# Patient Record
Sex: FEMALE | Race: WHITE | Employment: UNEMPLOYED | URBAN - NONMETROPOLITAN AREA
[De-identification: names, ages, dates, MRNs, and addresses within clinical notes are randomized per-mention and may not be internally consistent; named-entity substitution may affect disease eponyms.]

---

## 2022-02-09 ENCOUNTER — TELEPHONE (OUTPATIENT)
Dept: NEUROLOGY | Age: 52
End: 2022-02-09

## 2022-02-16 NOTE — TELEPHONE ENCOUNTER
Based on my reading of the referral packet, she had a coiling of an unruptured aneurysm. Based on that she should have a repeat angio at 6 months and 18 months from the date of treatment. Based on this she may have had one in December. If so, she should come in with the angio images and we will review and make a plan for future management. If she has not had a repeat angio then she will need to come in with images from the procedure and we will review and schedule the DSA. In summary, we can see her at our next available but we have to have images from the last procedure she had. No need for imaging before her appointment with us. Let me know if you have other questions.

## 2022-02-17 NOTE — TELEPHONE ENCOUNTER
LM regarding patient's history and the possibility of getting images from previous angio. Pt to call me with this information.

## 2022-02-25 ENCOUNTER — TELEPHONE (OUTPATIENT)
Dept: NEUROLOGY | Age: 52
End: 2022-02-25

## 2022-02-25 NOTE — TELEPHONE ENCOUNTER
Per [facility], films are being mailed to office. Once receiving disks, will call and schedule patient.

## 2022-03-01 NOTE — TELEPHONE ENCOUNTER
Received patient's films from [facility].     LM for patient to schedule consult per [facility] referral.

## 2022-03-29 ENCOUNTER — HOSPITAL ENCOUNTER (OUTPATIENT)
Dept: GENERAL RADIOLOGY | Age: 52
Discharge: HOME | End: 2022-03-29

## 2022-03-29 DIAGNOSIS — Z00.6 EXAMINATION FOR NORMAL COMPARISON FOR CLINICAL RESEARCH: ICD-10-CM

## 2022-04-26 ENCOUNTER — OFFICE VISIT (OUTPATIENT)
Dept: NEUROLOGY | Age: 52
End: 2022-04-26
Payer: COMMERCIAL

## 2022-04-26 VITALS
HEIGHT: 60 IN | WEIGHT: 181.4 LBS | BODY MASS INDEX: 35.61 KG/M2 | DIASTOLIC BLOOD PRESSURE: 88 MMHG | OXYGEN SATURATION: 97 % | HEART RATE: 62 BPM | SYSTOLIC BLOOD PRESSURE: 140 MMHG

## 2022-04-26 DIAGNOSIS — I67.1 CEREBRAL ANEURYSM: Primary | ICD-10-CM

## 2022-04-26 PROCEDURE — G8427 DOCREV CUR MEDS BY ELIG CLIN: HCPCS | Performed by: NURSE PRACTITIONER

## 2022-04-26 PROCEDURE — 4004F PT TOBACCO SCREEN RCVD TLK: CPT | Performed by: NURSE PRACTITIONER

## 2022-04-26 PROCEDURE — G8417 CALC BMI ABV UP PARAM F/U: HCPCS | Performed by: NURSE PRACTITIONER

## 2022-04-26 PROCEDURE — 99203 OFFICE O/P NEW LOW 30 MIN: CPT | Performed by: NURSE PRACTITIONER

## 2022-04-26 PROCEDURE — 3017F COLORECTAL CA SCREEN DOC REV: CPT | Performed by: NURSE PRACTITIONER

## 2022-04-26 RX ORDER — GABAPENTIN 300 MG/1
900 CAPSULE ORAL 3 TIMES DAILY
COMMUNITY

## 2022-04-26 RX ORDER — CLOPIDOGREL BISULFATE 75 MG/1
75 TABLET ORAL DAILY
Status: ON HOLD | COMMUNITY
End: 2022-05-09 | Stop reason: ALTCHOICE

## 2022-04-26 RX ORDER — SUMATRIPTAN 100 MG/1
100 TABLET, FILM COATED ORAL
COMMUNITY

## 2022-04-26 RX ORDER — TOPIRAMATE 25 MG/1
50 TABLET ORAL 2 TIMES DAILY
COMMUNITY

## 2022-04-26 ASSESSMENT — ENCOUNTER SYMPTOMS
COUGH: 0
SHORTNESS OF BREATH: 0
NAUSEA: 0
ABDOMINAL PAIN: 0
BACK PAIN: 1
VOMITING: 0
SORE THROAT: 0
RHINORRHEA: 0

## 2022-04-26 NOTE — PROGRESS NOTES
Neurology Office Note    Date:4/26/2022        Patient Name:Patito Dalton     YOB: 1970     Age:52 y.o. Requesting Physician: No att. 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 Her right ICA aneurysm was incidently found during a hospitalization where she had right arm numbness. She had this completed in [place] and then moved to [place] shortly after the procedure. She has not had any follow-up imaging. She has not had any follow-up imaging. She does report that she took both aspirin and Plavix for 6 month, but is currently just taking aspirin. She is a current smoker and has cut down from a pack per day to half pack per day. She does have a grand mother who has a history of a brain aneurysm. She is having her children and grandchildren getting checked out for for aneurysms. She does report headaches, lightheadedness, and dizziness. She denies any further numbness, weakness, speech difficulty, or new vision changes. She does have an established primary care provider in the [address] [place] [address] who is managing her risk factors. Review of Systems   Review of Systems   Constitutional: Negative for chills and fever. HENT: Negative for rhinorrhea and sore throat. Eyes: Positive for visual disturbance (chronic blurred vision, reports she needs to see an eye doctor). Respiratory: Negative for cough and shortness of breath.     Cardiovascular: Negative for chest pain and palpitations. Gastrointestinal: Negative for abdominal pain, nausea and vomiting. Genitourinary: Negative for dysuria. Musculoskeletal: Positive for back pain. Negative for arthralgias and myalgias. Skin: Negative for rash. Neurological: Positive for dizziness, light-headedness and headaches. Negative for facial asymmetry, speech difficulty, weakness and numbness. Psychiatric/Behavioral: The patient is not nervous/anxious. Medications   Home Meds:   Current Outpatient Medications on File Prior to Visit   Medication Sig Dispense Refill    topiramate (TOPAMAX) 25 MG tablet Take 50 mg by mouth 2 times daily      SUMAtriptan (IMITREX) 100 MG tablet Take 100 mg by mouth once as needed for Migraine      clopidogrel (PLAVIX) 75 MG tablet Take 75 mg by mouth daily      gabapentin (NEURONTIN) 300 MG capsule Take 900 mg by mouth 3 times daily. 3 capsules of 300mg TID      aspirin 81 MG EC tablet Take 81 mg by mouth daily. benzonatate (TESSALON) 100 MG capsule Take 100 mg by mouth 3 times daily as needed for Cough. Loratadine 10 MG CAPS Take  by mouth daily. omeprazole (PRILOSEC) 40 MG capsule Take 40 mg by mouth as needed. No current facility-administered medications on file prior to visit. PRN Meds:     Past History    Past Medical History:   has a past medical history of Anxiety, Asthma, Back pain, chronic, Bipolar 1 disorder ([address] Bronchitis, Cancer ([address] COPD (chronic obstructive pulmonary disease) ([address] DDD (degenerative disc disease), lumbar, Degenerative disc disease, cervical, Depression, Diverticulosis, Headache(784.0), History of lobectomy of lung, Hyperlipidemia, Kidney stone, Lung mass, Manic depression ([address] Pneumothorax, Sinus infection, and Ulcer. Social History:   reports that she has been smoking cigarettes. She started smoking about 37 years ago. She has a 15.00 pack-year smoking history. She has never used smokeless tobacco. She reports previous drug use. She reports that she does not drink alcohol. Family History:   Family History   Problem Relation Age of Onset    COPD Mother     Heart Disease Mother     High Cholesterol Mother     High Blood Pressure Mother     Colon Polyps Mother 64    Asthma Maternal Grandmother     Other Maternal Grandmother         hiatal hernia    Asthma Maternal Grandfather         only 1 lung    Heart Disease Maternal Grandfather     Cervical Cancer Sister 27    Cervical Cancer Sister 27       Physical Examination      Vitals:  BP (!) 140/88 (Site: Left Upper Arm, Position: Sitting, Cuff Size: Large Adult)   Pulse 62   Ht 5' (1.524 m)   Wt 181 lb 6.4 oz (82.3 kg)   SpO2 97%   BMI 35.43 kg/m²   No data recorded. Physical Exam  Vitals reviewed. Constitutional:       General: She is not in acute distress. Appearance: Normal appearance. She is not ill-appearing. HENT:      Head: Normocephalic and atraumatic. Right Ear: External ear normal.      Left Ear: External ear normal.      Nose: Nose normal.      Mouth/Throat:      Mouth: Mucous membranes are moist.      Pharynx: No oropharyngeal exudate or posterior oropharyngeal erythema. Eyes:      Extraocular Movements: EOM normal.      Pupils: Pupils are equal, round, and reactive to light. Cardiovascular:      Rate and Rhythm: Normal rate and regular rhythm. Heart sounds: Normal heart sounds. No murmur heard. Pulmonary:      Effort: Pulmonary effort is normal. No respiratory distress. Breath sounds: Normal breath sounds. No wheezing. Abdominal:      General: Bowel sounds are normal.      Palpations: Abdomen is soft. Tenderness: There is no abdominal tenderness. Musculoskeletal:         General: Normal range of motion. Right lower leg: No edema. Left lower leg: No edema. Skin:     General: Skin is warm. Findings: No rash. Neurological:      Mental Status: She is alert and oriented to person, place, and time. Coordination: Finger-Nose-Finger Test and Heel to [employer] normal.   Psychiatric:         Mood and Affect: Mood normal.         Speech: Speech normal.         Behavior: Behavior normal.       Neurologic Exam     Mental Status   Oriented to person, place, and time. Follows 1 step commands. Attention: normal. Concentration: normal.   Speech: speech is normal   Level of consciousness: alert  Knowledge: good. Cranial Nerves     CN II   Visual fields full to confrontation. CN III, IV, VI   Pupils are equal, round, and reactive to light. Extraocular motions are normal.   Right pupil: Size: 3 mm. Shape: regular. Reactivity: brisk. Left pupil: Size: 3 mm. Shape: regular. Reactivity: brisk. CN V   Facial sensation intact. CN VII   Facial expression full, symmetric. CN VIII   CN VIII normal.   Hearing: intact    CN IX, X   CN IX normal.   CN X normal.   Palate: symmetric    CN XI   CN XI normal.   Right trapezius strength: normal  Left trapezius strength: normal    CN XII   CN XII normal.   Tongue: not atrophic  Fasciculations: absent  Tongue deviation: none    Motor Exam   Muscle bulk: normal  Overall muscle tone: normal  Right arm pronator drift: absent  Left arm pronator drift: absent    BUE: 5/5  BLE: 5/5     Sensory Exam   Light touch normal.     Gait, Coordination, and Reflexes     Coordination   Finger to nose coordination: normal  Heel to shin coordination: normal    Tremor   Resting tremor: absent  Intention tremor: absent  Action tremor: absent       Labs/Imaging/Diagnostics   Labs:       Imaging:  No results found. Assessment and Plan:           Right internal carotid artery aneurysm s/p pipeline flow diverter placement 7/2021  Diagnostic cerebral angiogram images reviewed with Dr. Jaqui Arceo taking baby aspirin daily  Patient will be scheduled for a diagnostic cerebral angiogram with Dr. Emmanuel Abbasi within two weeks to assess healing of flow diverter and embolization of aneurysm. Benefits and risks (kidney toxicity, stroke, hematoma, dissection, and infection) explained to patient and she agrees to proceed with the angio. No further questions or concerns. Continue to follow up with primary care provider for tight risk factor control  Patient encouraged to continue to work on smoking cessation    This patient was seen and evaluated with Dr. Marge Heath and he is in agreement with the assessment and plan. Electronically signed by MAITE Cruz CNP on 4/26/22 at 2:24 PM EDT    I agree with evaluation and plan. Patient underwent right ICA ophthalmic non ruptured aneurysm embolization using flow diverter at outside hospital almost 8-9 months ago. She stopped plavix 6 months ago (continued asa) with no ischemic event. No need to restart plavix. We will need to do catheter angiogram to assess aneurysm healing.      Hiram Delgado MD

## 2022-05-09 ENCOUNTER — APPOINTMENT (OUTPATIENT)
Dept: INTERVENTIONAL RADIOLOGY/VASCULAR | Age: 52
End: 2022-05-09
Attending: PSYCHIATRY & NEUROLOGY
Payer: COMMERCIAL

## 2022-05-09 ENCOUNTER — HOSPITAL ENCOUNTER (OUTPATIENT)
Dept: INPATIENT UNIT | Age: 52
Discharge: HOME | End: 2022-05-09
Attending: PSYCHIATRY & NEUROLOGY | Admitting: PSYCHIATRY & NEUROLOGY
Payer: COMMERCIAL

## 2022-05-09 VITALS
OXYGEN SATURATION: 97 % | HEIGHT: 60 IN | HEART RATE: 58 BPM | WEIGHT: 180 LBS | TEMPERATURE: 98.6 F | DIASTOLIC BLOOD PRESSURE: 74 MMHG | BODY MASS INDEX: 35.34 KG/M2 | SYSTOLIC BLOOD PRESSURE: 102 MMHG | RESPIRATION RATE: 17 BRPM

## 2022-05-09 DIAGNOSIS — I72.5 BASILAR ARTERY ANEURYSM (HCC): Primary | ICD-10-CM

## 2022-05-09 LAB
ABO: NORMAL
ANION GAP SERPL CALCULATED.3IONS-SCNC: 15 MEQ/L (ref 8–16)
ANTIBODY SCREEN: NORMAL
APTT: 32.5 SECONDS (ref 22–38)
BUN BLDV-MCNC: 17 MG/DL (ref 7–22)
CALCIUM SERPL-MCNC: 9.9 MG/DL (ref 8.5–10.5)
CHLORIDE BLD-SCNC: 105 MEQ/L (ref 98–111)
CO2: 21 MEQ/L (ref 23–33)
CREAT SERPL-MCNC: 0.8 MG/DL (ref 0.4–1.2)
ERYTHROCYTE [DISTWIDTH] IN BLOOD BY AUTOMATED COUNT: 12.4 % (ref 11.5–14.5)
ERYTHROCYTE [DISTWIDTH] IN BLOOD BY AUTOMATED COUNT: 43.3 FL (ref 35–45)
GFR SERPL CREATININE-BSD FRML MDRD: 75 ML/MIN/1.73M2
GLUCOSE BLD-MCNC: 97 MG/DL (ref 70–108)
HCT VFR BLD CALC: 49.2 % (ref 37–47)
HEMOGLOBIN: 16.2 GM/DL (ref 12–16)
INR BLD: 0.91 (ref 0.85–1.13)
MCH RBC QN AUTO: 31.2 PG (ref 26–33)
MCHC RBC AUTO-ENTMCNC: 32.9 GM/DL (ref 32.2–35.5)
MCV RBC AUTO: 94.8 FL (ref 81–99)
PLATELET # BLD: 338 THOU/MM3 (ref 130–400)
PMV BLD AUTO: 10.6 FL (ref 9.4–12.4)
POTASSIUM SERPL-SCNC: 4.6 MEQ/L (ref 3.5–5.2)
PREGNANCY, SERUM: NEGATIVE
RBC # BLD: 5.19 MILL/MM3 (ref 4.2–5.4)
RH FACTOR: NORMAL
SODIUM BLD-SCNC: 141 MEQ/L (ref 135–145)
WBC # BLD: 13.7 THOU/MM3 (ref 4.8–10.8)

## 2022-05-09 PROCEDURE — C1769 GUIDE WIRE: HCPCS

## 2022-05-09 PROCEDURE — 6370000000 HC RX 637 (ALT 250 FOR IP): Performed by: PHYSICIAN ASSISTANT

## 2022-05-09 PROCEDURE — 85027 COMPLETE CBC AUTOMATED: CPT

## 2022-05-09 PROCEDURE — 99152 MOD SED SAME PHYS/QHP 5/>YRS: CPT | Performed by: PSYCHIATRY & NEUROLOGY

## 2022-05-09 PROCEDURE — 76376 3D RENDER W/INTRP POSTPROCES: CPT

## 2022-05-09 PROCEDURE — 6360000002 HC RX W HCPCS: Performed by: PHYSICIAN ASSISTANT

## 2022-05-09 PROCEDURE — 36225 PLACE CATH SUBCLAVIAN ART: CPT | Performed by: PSYCHIATRY & NEUROLOGY

## 2022-05-09 PROCEDURE — 84703 CHORIONIC GONADOTROPIN ASSAY: CPT

## 2022-05-09 PROCEDURE — 86900 BLOOD TYPING SEROLOGIC ABO: CPT

## 2022-05-09 PROCEDURE — 6360000002 HC RX W HCPCS: Performed by: PSYCHIATRY & NEUROLOGY

## 2022-05-09 PROCEDURE — 36224 PLACE CATH CAROTD ART: CPT

## 2022-05-09 PROCEDURE — 86850 RBC ANTIBODY SCREEN: CPT

## 2022-05-09 PROCEDURE — 75710 ARTERY X-RAYS ARM/LEG: CPT

## 2022-05-09 PROCEDURE — 36225 PLACE CATH SUBCLAVIAN ART: CPT

## 2022-05-09 PROCEDURE — C1887 CATHETER, GUIDING: HCPCS

## 2022-05-09 PROCEDURE — 36415 COLL VENOUS BLD VENIPUNCTURE: CPT

## 2022-05-09 PROCEDURE — 6360000004 HC RX CONTRAST MEDICATION: Performed by: PSYCHIATRY & NEUROLOGY

## 2022-05-09 PROCEDURE — 36226 PLACE CATH VERTEBRAL ART: CPT | Performed by: PSYCHIATRY & NEUROLOGY

## 2022-05-09 PROCEDURE — 85610 PROTHROMBIN TIME: CPT

## 2022-05-09 PROCEDURE — C1894 INTRO/SHEATH, NON-LASER: HCPCS

## 2022-05-09 PROCEDURE — 80048 BASIC METABOLIC PNL TOTAL CA: CPT

## 2022-05-09 PROCEDURE — 86901 BLOOD TYPING SEROLOGIC RH(D): CPT

## 2022-05-09 PROCEDURE — 2580000003 HC RX 258: Performed by: PHYSICIAN ASSISTANT

## 2022-05-09 PROCEDURE — 36224 PLACE CATH CAROTD ART: CPT | Performed by: PSYCHIATRY & NEUROLOGY

## 2022-05-09 PROCEDURE — 36216 PLACE CATHETER IN ARTERY: CPT

## 2022-05-09 PROCEDURE — 85730 THROMBOPLASTIN TIME PARTIAL: CPT

## 2022-05-09 PROCEDURE — 36226 PLACE CATH VERTEBRAL ART: CPT

## 2022-05-09 RX ORDER — SODIUM CHLORIDE 9 MG/ML
INJECTION, SOLUTION INTRAVENOUS CONTINUOUS
Status: DISCONTINUED | OUTPATIENT
Start: 2022-05-09 | End: 2022-05-10 | Stop reason: HOSPADM

## 2022-05-09 RX ORDER — ACETAMINOPHEN 500 MG
1000 TABLET ORAL ONCE
Status: COMPLETED | OUTPATIENT
Start: 2022-05-09 | End: 2022-05-09

## 2022-05-09 RX ORDER — SODIUM CHLORIDE 0.9 % (FLUSH) 0.9 %
5-40 SYRINGE (ML) INJECTION PRN
Status: DISCONTINUED | OUTPATIENT
Start: 2022-05-09 | End: 2022-05-10 | Stop reason: HOSPADM

## 2022-05-09 RX ORDER — SODIUM CHLORIDE 0.9 % (FLUSH) 0.9 %
5-40 SYRINGE (ML) INJECTION EVERY 12 HOURS SCHEDULED
Status: DISCONTINUED | OUTPATIENT
Start: 2022-05-09 | End: 2022-05-10 | Stop reason: HOSPADM

## 2022-05-09 RX ORDER — FENTANYL CITRATE 50 UG/ML
INJECTION, SOLUTION INTRAMUSCULAR; INTRAVENOUS
Status: COMPLETED | OUTPATIENT
Start: 2022-05-09 | End: 2022-05-09

## 2022-05-09 RX ORDER — SODIUM CHLORIDE 0.9 % (FLUSH) 0.9 %
5-40 SYRINGE (ML) INJECTION PRN
Status: DISCONTINUED | OUTPATIENT
Start: 2022-05-09 | End: 2022-05-09 | Stop reason: SDUPTHER

## 2022-05-09 RX ORDER — SODIUM CHLORIDE 0.9 % (FLUSH) 0.9 %
5-40 SYRINGE (ML) INJECTION EVERY 12 HOURS SCHEDULED
Status: DISCONTINUED | OUTPATIENT
Start: 2022-05-09 | End: 2022-05-09 | Stop reason: SDUPTHER

## 2022-05-09 RX ORDER — CAFFEINE 200 MG
200 TABLET ORAL ONCE
Status: COMPLETED | OUTPATIENT
Start: 2022-05-09 | End: 2022-05-09

## 2022-05-09 RX ORDER — ACETAMINOPHEN 325 MG/1
650 TABLET ORAL EVERY 4 HOURS PRN
Status: DISCONTINUED | OUTPATIENT
Start: 2022-05-09 | End: 2022-05-10 | Stop reason: HOSPADM

## 2022-05-09 RX ORDER — MIDAZOLAM HYDROCHLORIDE 1 MG/ML
INJECTION INTRAMUSCULAR; INTRAVENOUS
Status: COMPLETED | OUTPATIENT
Start: 2022-05-09 | End: 2022-05-09

## 2022-05-09 RX ORDER — MAGNESIUM SULFATE IN WATER 40 MG/ML
2000 INJECTION, SOLUTION INTRAVENOUS ONCE
Status: COMPLETED | OUTPATIENT
Start: 2022-05-09 | End: 2022-05-09

## 2022-05-09 RX ORDER — SODIUM CHLORIDE 9 MG/ML
INJECTION, SOLUTION INTRAVENOUS PRN
Status: DISCONTINUED | OUTPATIENT
Start: 2022-05-09 | End: 2022-05-10 | Stop reason: HOSPADM

## 2022-05-09 RX ORDER — SODIUM CHLORIDE 9 MG/ML
INJECTION, SOLUTION INTRAVENOUS PRN
Status: DISCONTINUED | OUTPATIENT
Start: 2022-05-09 | End: 2022-05-09 | Stop reason: SDUPTHER

## 2022-05-09 RX ORDER — SODIUM CHLORIDE 9 MG/ML
INJECTION, SOLUTION INTRAVENOUS CONTINUOUS
Status: DISCONTINUED | OUTPATIENT
Start: 2022-05-09 | End: 2022-05-09

## 2022-05-09 RX ADMIN — IOPAMIDOL 85 ML: 612 INJECTION, SOLUTION INTRAVENOUS at 15:29

## 2022-05-09 RX ADMIN — SODIUM CHLORIDE: 9 INJECTION, SOLUTION INTRAVENOUS at 11:34

## 2022-05-09 RX ADMIN — MIDAZOLAM 1 MG: 1 INJECTION INTRAMUSCULAR; INTRAVENOUS at 14:50

## 2022-05-09 RX ADMIN — FENTANYL CITRATE 25 MCG: 50 INJECTION, SOLUTION INTRAMUSCULAR; INTRAVENOUS at 14:51

## 2022-05-09 RX ADMIN — ACETAMINOPHEN 1000 MG: 500 TABLET ORAL at 11:18

## 2022-05-09 RX ADMIN — FENTANYL CITRATE 25 MCG: 50 INJECTION, SOLUTION INTRAMUSCULAR; INTRAVENOUS at 14:26

## 2022-05-09 RX ADMIN — SODIUM CHLORIDE: 9 INJECTION, SOLUTION INTRAVENOUS at 10:26

## 2022-05-09 RX ADMIN — MAGNESIUM SULFATE HEPTAHYDRATE 2000 MG: 40 INJECTION, SOLUTION INTRAVENOUS at 11:39

## 2022-05-09 RX ADMIN — MIDAZOLAM 1 MG: 1 INJECTION INTRAMUSCULAR; INTRAVENOUS at 14:26

## 2022-05-09 RX ADMIN — CAFFEINE 200 MG: 200 TABLET ORAL at 11:35

## 2022-05-09 ASSESSMENT — PAIN SCALES - GENERAL
PAINLEVEL_OUTOF10: 7
PAINLEVEL_OUTOF10: 0
PAINLEVEL_OUTOF10: 8
PAINLEVEL_OUTOF10: 0

## 2022-05-09 ASSESSMENT — PAIN DESCRIPTION - ONSET: ONSET: ON-GOING

## 2022-05-09 ASSESSMENT — PAIN SCALES - WONG BAKER: WONGBAKER_NUMERICALRESPONSE: 0

## 2022-05-09 ASSESSMENT — PAIN DESCRIPTION - LOCATION
LOCATION: HEAD
LOCATION: HEAD

## 2022-05-09 ASSESSMENT — ENCOUNTER SYMPTOMS
RHINORRHEA: 0
SHORTNESS OF BREATH: 0
COUGH: 0
BACK PAIN: 1
NAUSEA: 0
VOMITING: 0
SORE THROAT: 0
ABDOMINAL PAIN: 0

## 2022-05-09 ASSESSMENT — PAIN DESCRIPTION - FREQUENCY: FREQUENCY: CONTINUOUS

## 2022-05-09 ASSESSMENT — PAIN DESCRIPTION - PAIN TYPE: TYPE: ACUTE PAIN

## 2022-05-09 NOTE — H&P
Neurology Pre-operative H&P Note    Date:5/9/2022        Patient Name:Patito Dalton     YOB: 1970     Age:52 y.o. 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 Her right ICA aneurysm was incidently found during a hospitalization where she had right arm numbness. She had this completed in [place] and then moved to [place] shortly after the procedure. She has not had any follow-up imaging. She has not had any follow-up imaging. She does report that she took both aspirin and Plavix for 6 month, but is currently just taking aspirin. She is a current smoker and has cut down from a pack per day to half pack per day. She does have a grand mother who has a history of a brain aneurysm. She is having her children and grandchildren getting checked out for for aneurysms. She does report headaches, lightheadedness, and dizziness. She denies any further numbness, weakness, speech difficulty, or new vision changes. She does have an established primary care provider in the [address] [place] [address] who is managing her risk factors. Interval history 5/9/22: No changes to medical history. No new symptoms, other than a headache this morning. She states she usually drinks a pot of coffee, but has not had any this morning. Review of Systems   Review of Systems   Constitutional: Negative for chills and fever. HENT: Negative for rhinorrhea and sore throat.     Eyes: Positive for visual disturbance (chronic blurred vision, reports she needs to see an eye doctor). Respiratory: Negative for cough and shortness of breath. Cardiovascular: Negative for chest pain and palpitations. Gastrointestinal: Negative for abdominal pain, nausea and vomiting. Genitourinary: Negative for dysuria. Musculoskeletal: Positive for back pain. Negative for arthralgias and myalgias. Skin: Negative for rash. Neurological: Positive for dizziness, light-headedness and headaches. Negative for facial asymmetry, speech difficulty, weakness and numbness. Psychiatric/Behavioral: The patient is not nervous/anxious. Medications   Home Meds:   No current facility-administered medications on file prior to encounter. Current Outpatient Medications on File Prior to Encounter   Medication Sig Dispense Refill    topiramate (TOPAMAX) 25 MG tablet Take 50 mg by mouth 2 times daily      SUMAtriptan (IMITREX) 100 MG tablet Take 100 mg by mouth once as needed for Migraine      gabapentin (NEURONTIN) 300 MG capsule Take 900 mg by mouth 3 times daily. 3 capsules of 300mg TID      aspirin 81 MG EC tablet Take 81 mg by mouth daily.  benzonatate (TESSALON) 100 MG capsule Take 100 mg by mouth 3 times daily as needed for Cough.  Loratadine 10 MG CAPS Take  by mouth daily.  omeprazole (PRILOSEC) 40 MG capsule Take 40 mg by mouth as needed. PRN Meds:     Past History    Past Medical History:   has a past medical history of Anxiety, Asthma, Back pain, chronic, Bipolar 1 disorder ([address] Bronchitis, Cancer ([address] COPD (chronic obstructive pulmonary disease) ([address] DDD (degenerative disc disease), lumbar, Degenerative disc disease, cervical, Depression, Diverticulosis, Headache(784.0), History of blood transfusion, History of lobectomy of lung, Hyperlipidemia, Lung mass, Manic depression ([address] Pneumothorax, Sinus infection, and Ulcer. Social History:   reports that she has been smoking cigarettes. She started smoking about 37 years ago.  She has a 15.00 pack-year smoking history. She has never used smokeless tobacco. She reports previous drug use. She reports that she does not drink alcohol. Family History:   Family History   Problem Relation Age of Onset    Asthma Maternal Grandmother     Other Maternal Grandmother         hiatal hernia    Asthma Maternal Grandfather         only 1 lung    Heart Disease Maternal Grandfather     COPD Mother     Heart Disease Mother     High Cholesterol Mother     High Blood Pressure Mother     Colon Polyps Mother 64    Cervical Cancer Sister 27    Cervical Cancer Sister 27       Physical Examination      Vitals:  /68   Pulse 52   Temp 98.6 °F (37 °C) (Oral)   Resp 15   Ht 5' (1.524 m)   Wt 180 lb (81.6 kg)   SpO2 96%   BMI 35.15 kg/m²   Temp (24hrs), Av.6 °F (37 °C), Min:98.6 °F (37 °C), Max:98.6 °F (37 °C)        Physical Exam  Vitals reviewed. Constitutional:       General: She is not in acute distress. Appearance: Normal appearance. She is not ill-appearing. HENT:      Head: Normocephalic and atraumatic. Right Ear: External ear normal.      Left Ear: External ear normal.      Nose: Nose normal.      Mouth/Throat:      Mouth: Mucous membranes are moist.      Pharynx: No oropharyngeal exudate or posterior oropharyngeal erythema. Eyes:      Extraocular Movements: EOM normal.      Pupils: Pupils are equal, round, and reactive to light. Cardiovascular:      Rate and Rhythm: Normal rate and regular rhythm. Pulses:           Popliteal pulses are 2+ on the right side and 2+ on the left side. Dorsalis pedis pulses are 2+ on the right side and 2+ on the left side. Heart sounds: Normal heart sounds. No murmur heard. Pulmonary:      Effort: Pulmonary effort is normal. No respiratory distress. Breath sounds: Normal breath sounds. No wheezing. Abdominal:      General: Bowel sounds are normal.      Palpations: Abdomen is soft. Tenderness: There is no abdominal tenderness. Musculoskeletal:         General: Normal range of motion. Right lower leg: No edema. Left lower leg: No edema. Skin:     General: Skin is warm. Findings: No rash. Neurological:      Mental Status: She is alert and oriented to person, place, and time. Coordination: Finger-Nose-Finger Test and Heel to [employer] normal.   Psychiatric:         Mood and Affect: Mood normal.         Speech: Speech normal.         Behavior: Behavior normal.       Neurologic Exam     Mental Status   Oriented to person, place, and time. Follows 1 step commands. Attention: normal. Concentration: normal.   Speech: speech is normal   Level of consciousness: alert  Knowledge: good. Cranial Nerves     CN II   Visual fields full to confrontation. CN III, IV, VI   Pupils are equal, round, and reactive to light. Extraocular motions are normal.   Right pupil: Size: 3 mm. Shape: regular. Reactivity: brisk. Left pupil: Size: 3 mm. Shape: regular. Reactivity: brisk. CN V   Facial sensation intact. CN VII   Facial expression full, symmetric. CN VIII   CN VIII normal.   Hearing: intact    CN IX, X   CN IX normal.   CN X normal.   Palate: symmetric    CN XI   CN XI normal.   Right trapezius strength: normal  Left trapezius strength: normal    CN XII   CN XII normal.   Tongue: not atrophic  Fasciculations: absent  Tongue deviation: none    Motor Exam   Muscle bulk: normal  Overall muscle tone: normal  Right arm pronator drift: absent  Left arm pronator drift: absent    BUE: 5/5  BLE: 5/5     Sensory Exam   Light touch normal.     Gait, Coordination, and Reflexes     Coordination   Finger to nose coordination: normal  Heel to shin coordination: normal    Tremor   Resting tremor: absent  Intention tremor: absent  Action tremor: absent         Mallampati Score: 1          ASA Score:3        Labs/Imaging/Diagnostics   Labs:       Imaging:  No results found. Assessment and Plan:          1.  Right internal carotid artery aneurysm s/p pipeline flow diverter placement 7/2021. Diagnostic angiogram today. · Risks and benefits of the procedure were explained. All questions were answered. Written informed consent was obtained. We will plan to proceed with a diagnostic angiogram today assuming the patient's headache is well under control. 2. Headache  · 2 g magnesium sulfate ordered  · 200 mg of caffeine ordered  · 1 g of Tylenol ordered  · Increase fluid rate to 100 mL/h      This patient was seen and evaluated with Dr. Lynette Badillo and he is in agreement with the assessment and plan.     Electronically signed by Jason Low PA-C on 5/9/22 at 11:29 AM EDT

## 2022-05-09 NOTE — FLOWSHEET NOTE
Received from cath lab. Right groin stable. Pt aware to keep head flat. Not to move right leg or cross legs. 0.9 normal saline cont. Resting with easy resp. Denies pain or needs.

## 2022-05-09 NOTE — PROGRESS NOTES
[facility] consent, H&P, and/or immediate pre-procedure note completed  Staff involved in the case Sheryle Merl RN, Kai De La Rosa RN, Celestino Gates RT, [facility] RRT, Dr. Donald Evans, Raphael Andrade RT  1761 Patient received in cath lab for procedure family taken to waiting room. Neuro assessment per Steve Gramajo RN (Abbreviated neuro checks, vital signs, and pulse oximeter every 5 minutes documented in flow sheets)  1412 Pre-procedure peripheral pulse,  right pedal 2 right posterior tibial 2, left pedal 2, left posterior tibial 2  1412 Patient prepped for procedure  1448 Procedure started with Dr. Milka Mccollum performed and the following information was verified: correct patient, correct procedure, correct procedure site, correct position, correct laterality (if applicable), procedure site marked and visible (if applicable), and consents verified. Allergies reviewed. Pertinent diagnostic and radiologic results present and relevant images available (if applicable). All special equipment or special requirements available as applicable. Prophylactic antibiotics given as applicable. Fire risk safety assessment completed, shared with team and appropriate interventions implemented. 1450 Lidocaine 2% given, 10 mL subcutaneous by Dr. Donald Evans  1452 Access obtained at right femoral artery via 6 Fr sheath  1457 Angiogram of right common carotid artery completed  1459 Angiogram of right internal carotid artery completed  1500 Angiogram of right internal carotid artery completed  1503 Angiogram of right subclavian artery completed  1506 Angiogram of left common carotid artery completed  1508 Angiogram of left internal carotid artery completed x 2  1510 Angiogram of left vertebral artery completed  1518 Angiogram of left vertebral artery with spin completed  1524 Sheath-removed, intact, manual pressure held with quikclot  1526 Procedure completed; patient tolerated well.   1534 Femoral site; area soft to touch with no bleeding noted. 1534 Post-procedure peripheral pulse,  right pedal 2, right posterior tibial 2, left pedal 2, left posterior tibial 2  1534 Femoral dressing remains dry and intact with area soft.    Neuro assessment completed by Radhika Carrillo RN    3889 Case end time  1536 Patient on bed, patient exits procedural suite  1536 Patient take to 2E    Xray time- 7.3 minutes   341 mGy  Sedation time- 60 minutes (First dose until procedure end time)  Total contrast- 85 mls

## 2022-05-09 NOTE — PROGRESS NOTES
Patient underwent diagnostic angiogram.  Revealed complete obliteration of right ICA aneurysm. 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 Patient is to continue 81 mg of aspirin daily. She will follow-up in the neuro interventional clinic in 6 months with a CTA of the head prior to that visit.     Electronically signed by Teresita Villatoro PA-C on 5/9/22 at 5:18 PM EDT

## 2022-05-09 NOTE — PROGRESS NOTES
1030 Patient admitted to 2E  Ambulatory for cerebral angiogram.  Patient NPO. Patient accompanied by daughter, Shana Schmitz. Vital signs obtained. Assessment and data collection intiated. Oriented to room. Policies and procedures for 2E explained. All questions answered with no further questions at this time. Fall prevention and safety precautions discussed with patient.

## 2022-05-09 NOTE — PROGRESS NOTES
.Neuro Nurse Navigator Follow Up    This RN assessed patient. Patient is alert and oriented x 4. Patient's NIHSS is 0. Her pedal pulses are +2. Patient denies any questions. Patient's daughter at bedside with no questions as well.

## 2022-05-09 NOTE — PLAN OF CARE
Problem: Discharge Planning  Goal: Discharge to home or other facility with appropriate resources  Outcome: Completed  Flowsheets (Taken 5/9/2022 1045 by Rakesh Sanders RN)  Discharge to home or other facility with appropriate resources: Identify barriers to discharge with patient and caregiver     Problem: Pain  Goal: Verbalizes/displays adequate comfort level or baseline comfort level  Outcome: Completed     Problem: Neurosensory - Adult  Goal: Achieves stable or improved neurological status  Outcome: Completed

## 2022-05-09 NOTE — PROCEDURES
BRIEF POST-OPERATIVE PROCEDURE NOTE  (Full Note Pending)    Date of Procedure: 5/9/22      Surgeon(s): [employer] Pre-operative Diagnosis:  R ICA treated aneurysm (S/P pipeline flow diverter). Post-operative Diagnosis:  Completely healed R ICA aneurysm and new basilar/left PCA junction very small aneurysm (1.9x1mm). Procedure(s): Cerebral angiogram.    Anesthesia:  Moderate and local.     Additional Procedure Details:  Cerebral angiogram through right femoral artery     Brief Findings:  Completely healed R ICA aneurysm and new basilar/left PCA junction very small aneurysm (1.9x1mm). Estimated Blood Loss:  < 10 ml. Specimens:  None. Complications:  None.     Total Operative Time:  60 mintes    Disposition: Cath recovery            Condition: stable    Surgeon:   Jessica Jara MD;   Interventional Neurology, Stroke, Neuro Critical Care, Neurology  [address] [facility]

## 2022-05-09 NOTE — PROCEDURES
DATE OF PROCEDURE: 5/9/22. PREOPERATIVE DIAGNOSIS: R ICA treated aneurysm (S/P pipeline flow diverter). POSTOPERATIVE DIAGNOSIS: Completely healed R ICA (ophthalmic) aneurysm and new basilar/left PCA junction very small aneurysm (1.9x1mm). SURGEON: Marc oAntonio Weldon MD.    ANESTHESIA: Moderate and local.    MEDICATIONS: 2mg versed and 50 mcg fentanyl. CONTRAST: 85 mil si cristy. FLUORO TIME: 7.3 minutes. Estimated Blood Loss: < 10 ml. INTERVENTIONAL PROCEDURE:  4-VESSEL CEREBRAL ANGIOGRAM.  1 ROTATIONAL ANGIOGRAM.  US-GUIDED ARTERIAL ACCESS    CATHETERIZATION OF THE FOLLOWING VESSEL:  RIGHT COMMON CAROTID ARTERY. RIGHT INTERNAL CAROTID ARTERY. RIGHT SUBCLAVIAN ARTERY. LEFT COMMON CAROTID ARTERY. LEFT INTERNAL CAROTID ARTERY. LEFT SUBCLAVIAN ARTERY. LEFT VERTEBRAL ARTERY. ANGIOGRAPHY AND INTERPRETATION OF THE FOLLOWING IMAGES:  RIGHT COMMON CAROTID ARTERY CERVICAL STANDARD AP AND LATERAL ANGIOGRAM.  RIGHT INTERNAL CAROTID ARTERY CRANIAL STANDARD AP AND LATERAL ANGIOGRAM.   RIGHT INTERNAL CAROTID ARTERY CRANIAL OBLIQUE AP AND LATERAL ANGIOGRAM.   RIGHT SUBCLAVIAN ARTERY CERVICAL AP ANGIOGRAM.  LEFT COMMON CAROTID ARTERY CERVICAL STANDARD AP AND LATERAL ANGIOGRAM.  LEFT INTERNAL CAROTID ARTERY CRANIAL STANDARD AP AND LATERAL ANGIOGRAM.   LEFT INTERNAL CAROTID ARTERY CRANIAL OBLIQUE AP AND LATERAL ANGIOGRAM.   LEFT SUBCLAVIAN ARTERY CERVICAL AP ANGIOGRAM.  LEFT VERTEBRAL ARTERY STANDARD CRANIAL AP AND LATERAL ANGIOGRAM.  LEFT VERTEBRAL ARTERY CRANIAL ROTATIONAL ANGIOGRAM.  RIGHT FEMORAL ARTERY OBLIQUE AP ANGIOGRAM.      INDICATION:     65-year-old lady with previusely treated R ICA ophthalmic aneurysm using pipeline flow diverter at outside institution. She is here for diagnostic cerebral angiogram to assess aneurysm healing after embolization using flow diverter more than 6 months ago. Consent was obtained from the patient after explaining benefits and risks. All questions were answered. DESCRIPTION OF PROCEDURE AND FINDINGS:     The patient was brought into the angiography suite. Bilateral groins were sterilely prepped and draped in the usual fashion. I administered moderate sedation throughout this 60-minute procedure. An independent trained observer pushed medications at my direction, and monitored the patients level of consciousness and physiological status throughout. Because of inability to feel the femoral artery pulse well, the decision was made to use ultrasound guidance to identify the femoral artery. Then the needle was advanced under life US guidance to access the femoral artery that was visualized well and was fully patent during needle insertion. Seldinger technique was then used to pass a 4 [place] sheath into the right common iliac artery. This was then connected to a continuous flush drip. A 4 [place] terumo catheter was introduced into the aorta. Double flushing was then done. The catheter was used to select the right common carotid artery under guidance of fluoroscopy with the help of the glide wire. PA and lateral cervical angiograms were done. These angiograms show that there is no atherosclerotic disease/plaque in the right common carotid artery bifurcation and origin of the internal carotid artery. There is no disease in the cervical, petrous, cavernous, and supraclinoidal portions of internal carotid artery. The catheter was moved to the internal carotid artery under road map guidance, with the help of the glide wire. PA, oblique and lateral cranial angiograms were then obtained. The ophthalmic, anterior choroidal, posterior communicating were visualized. The middle cerebral artery and its branches including anterior M2, posterior M2, orbitofrontal, precentral, central, anterior and posterior parietal, anterior temporal, posterior temporal, angular, lenticulostriate, branches were visualized. The anterior cerebral artery including recurrent artery of [place], orbital, frontopolar, callosomarginal, pericallosal, other branches were visualized. The previously treated right internal carotid (ophthalmic segment) aneurysm is completely healed. The catheter was moved to the right subclavian artery under guidance of fluoroscopy with the help of the glide wire. PA cervical angiogram was then obtained by hand injection. These angiograms showed that the vertebral artery is small in size. There is no atherosclerotic disease/plaque in the subclavian artery and origin of the vertebral artery. There is no disease in the V1, V2, V3, and V4 portions of vertebral artery. The catheter was used to select the left common carotid artery under guidance of fluoroscopy with the help of the glide wire. PA and lateral cervical angiograms were done. These angiograms show that there is no atherosclerotic disease/plaque in the left common carotid artery bifurcation and origin of the internal carotid artery. There is no disease in the cervical, petrous, cavernous, and supraclinoidal portions of internal carotid artery. The catheter was moved to the internal carotid artery under road map guidance, with the help of the glide wire. PA, oblique and lateral cranial angiograms were then obtained. The ophthalmic, anterior choroidal, posterior communicating were visualized. The middle cerebral artery and its branches including anterior M2, posterior M2, orbitofrontal, precentral, central, anterior and posterior parietal, anterior temporal, posterior temporal, angular, lenticulostriate, branches were visualized. The anterior cerebral artery including recurrent artery of [place], orbital, frontopolar, callosomarginal, pericallosal, other branches were visualized. All of these branches were within normal limits and do not have aneurysms, arteriovenous malformations or significant atherosclerotic or vasculitic changes.      The catheter was moved to the left subclavian artery under guidance of fluoroscopy with the help of the glide wire. PA cervical angiogram was then obtained by hand injection. These angiograms showed that the vertebral artery is large in size. There is no atherosclerotic disease/plaque in the subclavian artery and origin of the vertebral artery. There is no disease in the V1, V2, V3, and V4 portions of vertebral artery. The catheter was moved to the left vertebral artery under road map guidance, with, the help of the glide wire. PA, lateral and rotational cranial angiograms were then obtained. The rotational (3D) angiogram was volume-rendered (re-constructed) under my direct supervision on a different station for better visualization of the vessels. The left vertebral and basilar artery including posterior cerebral, superior cerebellar, anterior inferior cerebellar, posterior inferior cerebellar branches were visualized. There is no disease in the proximal, mid and distal portions of basilar artery. There is very small wide necked aneurysm at the junction of basilar artery and left posterior cerebral artery that measures 1. 9x1 mm. This aneurysm is untreatable and is at low risk for bleeding due to very small size. The catheter was withdrawn. Angiogram of the right femoral artery showed that the sheath is in the common femoral artery. At this point, the femoral sheath was pulled and excellent hemostasis was achieved by manual compression. Sterile dressing was then placed overlying the puncture site. The patient was then transferred to the cath recovery. There were no complications from the procedure. IMPRESSION:  The previously treated right internal carotid (ophthalmic segment) aneurysm is completely healed. There is very small wide necked aneurysm at the junction of basilar artery and left posterior cerebral artery that measures 1. 9x1 mm. This aneurysm is untreatable and is at low risk for bleeding due to very small size (CTA in 6 months).

## 2022-05-10 NOTE — FLOWSHEET NOTE
Discharge instructions with AVS review completed. Questions and concerns addressed. Right groin stable. Iv catheter removed. Telemetry off. Preparing for discharge.

## 2022-08-01 ENCOUNTER — APPOINTMENT (OUTPATIENT)
Dept: CT IMAGING | Age: 52
End: 2022-08-01
Payer: COMMERCIAL

## 2022-08-01 ENCOUNTER — TELEPHONE (OUTPATIENT)
Dept: NEUROLOGY | Age: 52
End: 2022-08-01

## 2022-08-01 ENCOUNTER — HOSPITAL ENCOUNTER (EMERGENCY)
Age: 52
Discharge: HOME | End: 2022-08-01
Attending: EMERGENCY MEDICINE
Payer: COMMERCIAL

## 2022-08-01 ENCOUNTER — APPOINTMENT (OUTPATIENT)
Dept: GENERAL RADIOLOGY | Age: 52
End: 2022-08-01
Payer: COMMERCIAL

## 2022-08-01 VITALS
BODY MASS INDEX: 34.36 KG/M2 | WEIGHT: 175 LBS | DIASTOLIC BLOOD PRESSURE: 83 MMHG | RESPIRATION RATE: 18 BRPM | OXYGEN SATURATION: 96 % | HEIGHT: 60 IN | SYSTOLIC BLOOD PRESSURE: 128 MMHG | TEMPERATURE: 98.6 F | HEART RATE: 61 BPM

## 2022-08-01 DIAGNOSIS — G43.909 MIGRAINE WITHOUT STATUS MIGRAINOSUS, NOT INTRACTABLE, UNSPECIFIED MIGRAINE TYPE: Primary | ICD-10-CM

## 2022-08-01 LAB
ALBUMIN SERPL-MCNC: 4.3 G/DL (ref 3.5–5.1)
ALP BLD-CCNC: 134 U/L (ref 38–126)
ALT SERPL-CCNC: 15 U/L (ref 11–66)
ANION GAP SERPL CALCULATED.3IONS-SCNC: 12 MEQ/L (ref 8–16)
AST SERPL-CCNC: 17 U/L (ref 5–40)
BASOPHILS # BLD: 0.7 %
BASOPHILS ABSOLUTE: 0.1 THOU/MM3 (ref 0–0.1)
BILIRUB SERPL-MCNC: 0.2 MG/DL (ref 0.3–1.2)
BUN BLDV-MCNC: 14 MG/DL (ref 7–22)
CALCIUM SERPL-MCNC: 9.3 MG/DL (ref 8.5–10.5)
CHLORIDE BLD-SCNC: 105 MEQ/L (ref 98–111)
CO2: 22 MEQ/L (ref 23–33)
CREAT SERPL-MCNC: 0.8 MG/DL (ref 0.4–1.2)
EOSINOPHIL # BLD: 1.8 %
EOSINOPHILS ABSOLUTE: 0.2 THOU/MM3 (ref 0–0.4)
ERYTHROCYTE [DISTWIDTH] IN BLOOD BY AUTOMATED COUNT: 12.4 % (ref 11.5–14.5)
ERYTHROCYTE [DISTWIDTH] IN BLOOD BY AUTOMATED COUNT: 42.1 FL (ref 35–45)
GFR SERPL CREATININE-BSD FRML MDRD: 75 ML/MIN/1.73M2
GLUCOSE BLD-MCNC: 86 MG/DL (ref 70–108)
HCT VFR BLD CALC: 40.6 % (ref 37–47)
HEMOGLOBIN: 13.9 GM/DL (ref 12–16)
IMMATURE GRANS (ABS): 0.06 THOU/MM3 (ref 0–0.07)
IMMATURE GRANULOCYTES: 0.6 %
LYMPHOCYTES # BLD: 35.9 %
LYMPHOCYTES ABSOLUTE: 3.8 THOU/MM3 (ref 1–4.8)
MCH RBC QN AUTO: 31.7 PG (ref 26–33)
MCHC RBC AUTO-ENTMCNC: 34.2 GM/DL (ref 32.2–35.5)
MCV RBC AUTO: 92.7 FL (ref 81–99)
MONOCYTES # BLD: 8.3 %
MONOCYTES ABSOLUTE: 0.9 THOU/MM3 (ref 0.4–1.3)
NUCLEATED RED BLOOD CELLS: 0 /100 WBC
OSMOLALITY CALCULATION: 277.3 MOSMOL/KG (ref 275–300)
PLATELET # BLD: 332 THOU/MM3 (ref 130–400)
PMV BLD AUTO: 10.7 FL (ref 9.4–12.4)
POTASSIUM REFLEX MAGNESIUM: 3.8 MEQ/L (ref 3.5–5.2)
RBC # BLD: 4.38 MILL/MM3 (ref 4.2–5.4)
SEG NEUTROPHILS: 52.7 %
SEGMENTED NEUTROPHILS ABSOLUTE COUNT: 5.5 THOU/MM3 (ref 1.8–7.7)
SODIUM BLD-SCNC: 139 MEQ/L (ref 135–145)
TOTAL PROTEIN: 6.9 G/DL (ref 6.1–8)
TROPONIN T: < 0.01 NG/ML
WBC # BLD: 10.5 THOU/MM3 (ref 4.8–10.8)

## 2022-08-01 PROCEDURE — 84484 ASSAY OF TROPONIN QUANT: CPT

## 2022-08-01 PROCEDURE — 96375 TX/PRO/DX INJ NEW DRUG ADDON: CPT

## 2022-08-01 PROCEDURE — 6360000004 HC RX CONTRAST MEDICATION: Performed by: EMERGENCY MEDICINE

## 2022-08-01 PROCEDURE — 2580000003 HC RX 258: Performed by: STUDENT IN AN ORGANIZED HEALTH CARE EDUCATION/TRAINING PROGRAM

## 2022-08-01 PROCEDURE — 93005 ELECTROCARDIOGRAM TRACING: CPT | Performed by: EMERGENCY MEDICINE

## 2022-08-01 PROCEDURE — 6360000002 HC RX W HCPCS: Performed by: STUDENT IN AN ORGANIZED HEALTH CARE EDUCATION/TRAINING PROGRAM

## 2022-08-01 PROCEDURE — 70450 CT HEAD/BRAIN W/O DYE: CPT

## 2022-08-01 PROCEDURE — 36415 COLL VENOUS BLD VENIPUNCTURE: CPT

## 2022-08-01 PROCEDURE — 85025 COMPLETE CBC W/AUTO DIFF WBC: CPT

## 2022-08-01 PROCEDURE — 96374 THER/PROPH/DIAG INJ IV PUSH: CPT

## 2022-08-01 PROCEDURE — 80053 COMPREHEN METABOLIC PANEL: CPT

## 2022-08-01 PROCEDURE — 70496 CT ANGIOGRAPHY HEAD: CPT

## 2022-08-01 PROCEDURE — 99285 EMERGENCY DEPT VISIT HI MDM: CPT | Performed by: SOCIAL WORKER

## 2022-08-01 PROCEDURE — 71045 X-RAY EXAM CHEST 1 VIEW: CPT

## 2022-08-01 PROCEDURE — 99285 EMERGENCY DEPT VISIT HI MDM: CPT

## 2022-08-01 PROCEDURE — 6370000000 HC RX 637 (ALT 250 FOR IP): Performed by: STUDENT IN AN ORGANIZED HEALTH CARE EDUCATION/TRAINING PROGRAM

## 2022-08-01 PROCEDURE — 70498 CT ANGIOGRAPHY NECK: CPT

## 2022-08-01 RX ORDER — ACETAMINOPHEN 325 MG/1
650 TABLET ORAL ONCE
Status: COMPLETED | OUTPATIENT
Start: 2022-08-01 | End: 2022-08-01

## 2022-08-01 RX ORDER — 0.9 % SODIUM CHLORIDE 0.9 %
1000 INTRAVENOUS SOLUTION INTRAVENOUS ONCE
Status: COMPLETED | OUTPATIENT
Start: 2022-08-01 | End: 2022-08-01

## 2022-08-01 RX ORDER — DIPHENHYDRAMINE HYDROCHLORIDE 50 MG/ML
50 INJECTION INTRAMUSCULAR; INTRAVENOUS ONCE
Status: COMPLETED | OUTPATIENT
Start: 2022-08-01 | End: 2022-08-01

## 2022-08-01 RX ORDER — DEXAMETHASONE SODIUM PHOSPHATE 4 MG/ML
10 INJECTION, SOLUTION INTRA-ARTICULAR; INTRALESIONAL; INTRAMUSCULAR; INTRAVENOUS; SOFT TISSUE ONCE
Status: COMPLETED | OUTPATIENT
Start: 2022-08-01 | End: 2022-08-01

## 2022-08-01 RX ORDER — PROCHLORPERAZINE EDISYLATE 5 MG/ML
10 INJECTION INTRAMUSCULAR; INTRAVENOUS ONCE
Status: COMPLETED | OUTPATIENT
Start: 2022-08-01 | End: 2022-08-01

## 2022-08-01 RX ADMIN — DEXAMETHASONE SODIUM PHOSPHATE 10 MG: 4 INJECTION, SOLUTION INTRAMUSCULAR; INTRAVENOUS at 17:50

## 2022-08-01 RX ADMIN — PROCHLORPERAZINE EDISYLATE 10 MG: 5 INJECTION INTRAMUSCULAR; INTRAVENOUS at 16:36

## 2022-08-01 RX ADMIN — IOPAMIDOL 80 ML: 755 INJECTION, SOLUTION INTRAVENOUS at 17:06

## 2022-08-01 RX ADMIN — ACETAMINOPHEN 325MG 650 MG: 325 TABLET ORAL at 16:36

## 2022-08-01 RX ADMIN — DIPHENHYDRAMINE HYDROCHLORIDE 50 MG: 50 INJECTION, SOLUTION INTRAMUSCULAR; INTRAVENOUS at 16:36

## 2022-08-01 RX ADMIN — SODIUM CHLORIDE 1000 ML: 9 INJECTION, SOLUTION INTRAVENOUS at 16:39

## 2022-08-01 ASSESSMENT — ENCOUNTER SYMPTOMS
VOMITING: 0
EYE REDNESS: 0
NAUSEA: 0
TROUBLE SWALLOWING: 0
SORE THROAT: 0
PHOTOPHOBIA: 1
DIARRHEA: 0
SINUS PAIN: 0
SHORTNESS OF BREATH: 0
ABDOMINAL PAIN: 0
BACK PAIN: 0
COLOR CHANGE: 0
COUGH: 0
RHINORRHEA: 0

## 2022-08-01 NOTE — ED NOTES
Pt and vs reassessed. RR easy and unlabored. Pt standing at bedside and states she would like to go because her grandkids are going to her house soon. Pt states she saw on her mychart that her labs are back and would just like the doctor to call her when her CT scan results.  RN informed Dr. Jadon De La Cruz who is now at bedside assessing pt     Dexter Dias RN  08/01/22 [id number]

## 2022-08-01 NOTE — ED PROVIDER NOTES
[facility] ENCOUNTER          Pt Name: Denis Dalton  MRN: [id number]  [place] 1970  Date of evaluation: 8/1/2022  Treating Resident Physician: Yobani Iglesias MD  Supervising Physician: Dr Leslie Hawkins       Chief Complaint   Patient presents with    Headache     History obtained from the patient. HISTORY OF PRESENT ILLNESS    DIPESH Dalton is a 47 y.o. female with pmhx of COPD, bipolar 1 disorder, brain aneurysms with craniectomy surgery on 7/30/2021 presents to the emergency department for evaluation of migraine the last 5 days became persistent behind both eyes sharp and dull at times. It has been constant with some associated minimal photophobia. Feels worsening typical baseline both in its severity as well as his length. Does mention some blurred vision last Friday as well. Denies any weakness, numbness, tingling, jaw claudication, recent traumatic injuries, fever, chills, rashes, recent international travel. Patient also discussed her symptoms with her oncall neurologist who advised her to come to the ED for further evaluation. The patient has no other acute complaints at this time. REVIEW OF SYSTEMS   Review of Systems   Constitutional:  Negative for chills and fever. HENT:  Negative for rhinorrhea, sinus pain and sore throat. Eyes:  Positive for photophobia and visual disturbance. Negative for redness. Respiratory:  Negative for cough and shortness of breath. Cardiovascular:  Negative for chest pain. Gastrointestinal:  Negative for abdominal pain, diarrhea, nausea and vomiting. Genitourinary:  Negative for dysuria. Musculoskeletal:  Negative for back pain and neck stiffness. Skin:  Negative for rash. Neurological:  Positive for headaches. Negative for weakness and light-headedness. Psychiatric/Behavioral:  Negative for agitation.         PAST MEDICAL AND SURGICAL HISTORY Past Medical History:   Diagnosis Date    Anxiety     Asthma     Back pain, chronic     Bipolar 1 disorder (HCC)     Bronchitis     Cancer (HCC)     cervical    COPD (chronic obstructive pulmonary disease) (HCC)     DDD (degenerative disc disease), lumbar     Degenerative disc disease, cervical     Depression     Diverticulosis     Headache(784.0)     migraines    History of blood transfusion     pregnancy     History of lobectomy of lung 2015    Right upper lobe    Hyperlipidemia     Lung mass 2017    left    Manic depression ([address]     Pneumothorax 2015    Right lung    Sinus infection     Ulcer      Past Surgical History:   Procedure Laterality Date    BRAIN ANEURYSM SURGERY  07/30/2021    opthalmic pipeline embolization    COLONOSCOPY  01/01/2013    ECTOPIC PREGNANCY SURGERY      HYSTERECTOMY (CERVIX STATUS UNKNOWN)      INCONTINENCE SURGERY      LUNG SURGERY      MOUTH SURGERY      MOUTH SURGERY      PLEURAL SCARIFICATION      right    SEPTOPLASTY  05/31/2013    Dr. Donald Lainez, Resection of Rt. & Lt. Inferior turbinate, Lt & Rt. anterior ethmoidectomy Rt. & Lt. frontal sinusotomy & biopsy, direct laryngoscopy, hypopharyngoscopy & biopsy                        SINUS SURGERY      TONSILLECTOMY  01/16/2014         MEDICATIONS   No current facility-administered medications for this encounter. Current Outpatient Medications:     topiramate (TOPAMAX) 25 MG tablet, Take 50 mg by mouth 2 times daily, Disp: , Rfl:     SUMAtriptan (IMITREX) 100 MG tablet, Take 100 mg by mouth once as needed for Migraine, Disp: , Rfl:     gabapentin (NEURONTIN) 300 MG capsule, Take 900 mg by mouth 3 times daily. 3 capsules of 300mg TID, Disp: , Rfl:     aspirin 81 MG EC tablet, Take 81 mg by mouth daily. , Disp: , Rfl:     benzonatate (TESSALON) 100 MG capsule, Take 100 mg by mouth 3 times daily as needed for Cough. , Disp: , Rfl:     Loratadine 10 MG CAPS, Take  by mouth daily. , Disp: , Rfl:     omeprazole (PRILOSEC) 40 MG capsule, Take 40 mg by mouth as needed. , Disp: , Rfl:       SOCIAL HISTORY     Social History     Social History Narrative    Not on file     Social History     Tobacco Use    Smoking status: Every Day     Packs/day: 0.50     Years: 30.00     Pack years: 15.00     Types: Cigarettes     Start date: 6/9/1984    Smokeless tobacco: Never   Vaping Use    Vaping Use: Never used   Substance Use Topics    Alcohol use: No    Drug use: Not Currently         ALLERGIES     Allergies   Allergen Reactions    Codeine Nausea And Vomiting    Tramadol Nausea And Vomiting     HA, nausea         FAMILY HISTORY     Family History   Problem Relation Age of Onset    Asthma Maternal Grandmother     Other Maternal Grandmother         hiatal hernia    Asthma Maternal Grandfather         only 1 lung    Heart Disease Maternal Grandfather     COPD Mother     Heart Disease Mother     High Cholesterol Mother     High Blood Pressure Mother     Colon Polyps Mother 64    Cervical Cancer Sister 27    Cervical Cancer Sister 27         PREVIOUS RECORDS   Previous records reviewed: Patient was seen here on 5/9/2022 for basilar artery aneursym. PHYSICAL EXAM     ED Triage Vitals [08/01/22 1610]   BP Temp Temp Source Heart Rate Resp SpO2 Height Weight   122/78 98.6 °F (37 °C) Oral 60 18 95 % 5' (1.524 m) 175 lb (79.4 kg)     Initial vital signs and nursing assessment reviewed and normal. Pulsoximetry is normal per my interpretation. Additional Vital Signs:  Vitals:    08/01/22 1739   BP: 131/70   Pulse: 59   Resp: 18   Temp:    SpO2: 97%       Physical Exam  Vitals and nursing note reviewed. Constitutional:       General: She is not in acute distress. Appearance: She is not ill-appearing, toxic-appearing or diaphoretic. HENT:      Head: Normocephalic and atraumatic.       Right Ear: External ear normal.      Left Ear: External ear normal.      Nose: Nose normal.      Mouth/Throat:      Mouth: Mucous membranes are moist.      Pharynx: Oropharynx is clear.   Eyes:      General: No visual field deficit or scleral icterus. Extraocular Movements: Extraocular movements intact. Conjunctiva/sclera: Conjunctivae normal.      Pupils: Pupils are equal, round, and reactive to light. Cardiovascular:      Rate and Rhythm: Normal rate and regular rhythm. Pulses: Normal pulses. Heart sounds: Normal heart sounds. Pulmonary:      Effort: Pulmonary effort is normal. No respiratory distress. Breath sounds: Normal breath sounds. Abdominal:      General: Abdomen is flat. There is no distension. Palpations: Abdomen is soft. Tenderness: There is no abdominal tenderness. There is no guarding or rebound. Musculoskeletal:         General: Normal range of motion. Cervical back: Normal range of motion and neck supple. No rigidity. No muscular tenderness. Lymphadenopathy:      Cervical: No cervical adenopathy. Skin:     General: Skin is warm and dry. Capillary Refill: Capillary refill takes less than 2 seconds. Coloration: Skin is not jaundiced. Neurological:      General: No focal deficit present. Mental Status: She is alert and oriented to person, place, and time. GCS: GCS eye subscore is 4. GCS verbal subscore is 5. GCS motor subscore is 6. Cranial Nerves: Cranial nerves are intact. No cranial nerve deficit, dysarthria or facial asymmetry. Sensory: Sensation is intact. No sensory deficit. Motor: Motor function is intact. No weakness, tremor, atrophy, abnormal muscle tone, seizure activity or pronator drift. Coordination: Heel to Shin Test normal.   Psychiatric:         Mood and Affect: Mood normal.         Behavior: Behavior normal.         MEDICAL DECISION MAKING      Patient CTA of the head and neck are negative for any acute aneurysms or acute hemorrhaging. Patient's laboratory studies show no significant derangements. Her migraine has been significant proved with current treatment.   She has no focal neurological deficits upon examination. She will be discharged after contraction and discussion with inpatient neurology team who agrees with the plan. She will be advised to return if any new or symptoms occur. ED RESULTS   Laboratory results:  Labs Reviewed   COMPREHENSIVE METABOLIC PANEL W/ REFLEX TO MG FOR LOW K - Abnormal; Notable for the following components:       Result Value    CO2 22 (*)     Alkaline Phosphatase 134 (*)     Total Bilirubin 0.2 (*)     All other components within normal limits   GLOMERULAR FILTRATION RATE, ESTIMATED - Abnormal; Notable for the following components:    Est, Glom Filt Rate 75 (*)     All other components within normal limits   CBC WITH AUTO DIFFERENTIAL   TROPONIN   ANION GAP   OSMOLALITY       Radiologic studies results:  CT Head WO Contrast   Final Result   1. No acute intracranial findings      This document has been electronically signed by: Elvis Blanchard MD on 08/01/2022 05:36 PM      All CTs at this facility use dose modulation techniques and iterative    reconstructions, and/or weight-based dosing   when appropriate to reduce radiation to a low as reasonably achievable. CTA HEAD W WO CONTRAST   Final Result   Impression:   1. Patent CTA head. This document has been electronically signed by: Elvis Blanchard MD on 08/01/2022 05:55 PM      All CTs at this facility use dose modulation techniques and iterative    reconstructions, and/or weight-based dosing   when appropriate to reduce radiation to a low as reasonably achievable. 3D Post-processing was performed on this study. CTA NECK W WO CONTRAST   Final Result   Patent neck CTA. This document has been electronically signed by:  Elvis Blanchard MD on 08/01/2022 05:55 PM      All CTs at this facility use dose modulation techniques and iterative    reconstructions, and/or weight-based dosing   when appropriate to reduce radiation to a low as reasonably achievable. Carotid stenosis and measurements are in accordance with NASCET criteria. 3D Post-processing was performed on this study. XR CHEST PORTABLE   Final Result   Stable chest no acute cardiopulmonary disease            **This report has been created using voice recognition software. It may contain minor errors which are inherent in voice recognition technology. **      Final report electronically signed by Dr. Petar Lama on 8/1/2022 4:54 PM          ED Medications administered this visit:   Medications   acetaminophen (TYLENOL) tablet 650 mg (650 mg Oral Given 8/1/22 1636)   0.9 % sodium chloride bolus (1,000 mLs IntraVENous New Bag 8/1/22 1639)   prochlorperazine (COMPAZINE) injection 10 mg (10 mg IntraVENous Given 8/1/22 1636)   diphenhydrAMINE (BENADRYL) injection 50 mg (50 mg IntraVENous Given 8/1/22 1636)   iopamidol (ISOVUE-370) 76 % injection 80 mL (80 mLs IntraVENous Given 8/1/22 1706)   Dexamethasone Sodium Phosphate injection 10 mg (10 mg IntraVENous Given 8/1/22 1750)         ED COURSE     ED Course as of 08/01/22 1801   Mon Aug 01, 2022   1743 CT Head WO Contrast  \"   IMPRESSION:  1. No acute intracranial findings\" [AL]   1743 XR CHEST PORTABLE  \"IMPRESSION:  Stable chest no acute cardiopulmonary disease\" [AL]   7081 CTA HEAD W WO CONTRAST  \"Findings: The intracranial carotid arteries are patent. There is a patent stent at  the right carotid siphon. No aneurysm coils or clips. The vertebral basilar system is patent. Cerebral and cerebellar arteries are patent. Anterior to the right carotid siphon stenosis on image 185 series 504,  there is a curvilinear calcification noted. There is no contrast filling  of an aneurysm in this area. There is no intracranial abnormal contrast enhancement. IMPRESSION:  Impression:  1. Patent CTA head. \" [AL]   [address]  \"Findings: Aortic arch and cervical great vessels are patent.      Thyroid gland unremarkable. No cervical mass or fluid collection. Mild emphysema and atelectasis of the upper lungs. No acute fracture. IMPRESSION:  Patent neck CTA. \" [AL]      ED Course User Index  [AL] Javad Brink MD     Strict return precautions and follow up instructions were discussed with the patient prior to discharge, with which the patient agrees. MEDICATION CHANGES     New Prescriptions    No medications on file         FINAL DISPOSITION     Final diagnoses:   Migraine without status migrainosus, not intractable, unspecified migraine type     Condition: condition: good  Dispo: Discharge to home      This transcription was electronically signed. Parts of this transcriptions may have been dictated by use of voice recognition software and electronically transcribed, and parts may have been transcribed with the assistance of an ED scribe. The transcription may contain errors not detected in proofreading. Please refer to my supervising physician's documentation if my documentation differs.     Electronically Signed: Javad Brink MD, 08/01/22, 6:01 PM         Javad Brink MD  Resident  08/01/22 [id number]

## 2022-08-01 NOTE — TELEPHONE ENCOUNTER
Pt LM stating she has had severe headache for 5 days. Pt has history of aneurysms according to notations and imaging. 7- flow diverter was placed for 6mm aneurysm in Right ICA in [place]. Pt to have DSA in November after follow up with Dr. Kwabena Arciniega regarding \"new basilar left PCA junction small aneurysm\".

## 2022-08-01 NOTE — ED TRIAGE NOTES
Pt presents to the ED via triage with c/o headache. Pt states she has had a migraine for the last 5 days that is constant. Pt states she has pain around her temples and behind her right eye. Pt states she has a history of brain aneurysms. Pt states her vision has been increasingly blurry over the last five days. Pt denies any vomiting but states she has been nauseous all day. Pt states she is also having pain around her left shoulder that began when she began to have the migraines. EKG complete. RR easy and unlabored.

## 2022-08-01 NOTE — TELEPHONE ENCOUNTER
Pt returned phone call stating she has had a severe headache for 5 days and her vision is blurry. She stated she periodically gets a pain between her left breast and left shoulder area. She also states she has been very fatigued. Pt states she is still smoking but is down to 2 cigarettes a day. Stated she has not been on blood thinners and stated our providers had discontinued them. Pt recommended to come to the emergency room to get assessed since she is having new symptoms. Pt notified that CHERISE Romero was aware of patient being directed to ED.

## 2022-08-01 NOTE — DISCHARGE INSTRUCTIONS
Take your medications as prescribed and follow-up with neurology next 2 days for further evaluation. Return to the emergency room for any new or worsening symptoms.

## 2022-08-02 LAB
EKG ATRIAL RATE: 59 BPM
EKG P AXIS: 56 DEGREES
EKG P-R INTERVAL: 170 MS
EKG Q-T INTERVAL: 414 MS
EKG QRS DURATION: 88 MS
EKG QTC CALCULATION (BAZETT): 409 MS
EKG R AXIS: 6 DEGREES
EKG T AXIS: 23 DEGREES
EKG VENTRICULAR RATE: 59 BPM

## 2022-08-02 PROCEDURE — 93010 ELECTROCARDIOGRAM REPORT: CPT | Performed by: INTERNAL MEDICINE

## 2022-08-02 NOTE — CONSULTS
Neurology Consult Note    Date:8/1/2022       Room:20/020A  Patient Name:Patito Dalton     YOB: 1970     Age:52 y.o. 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 Patient also states \"the top of my head feels like a mushroom\" which she characterizes as a swollen feeling on the right side of her head. She states that pain radiates down her neck intermittently. Patient has taken ibuprofen, Excedrin Migraine, Tylenol, Imitrex and continues her daily Topamax without relief. Patient reports mild photophobia with the headache but no other neurologic complaints. She denies illness symptoms. ED course is significant for CT head which revealed no acute process and CTA head and neck which did not demonstrate the small aneurysm which had been seen on diagnostic angiogram.  Patient received migraine cocktail in the ED including Compazine, Benadryl, Tylenol which decreased pain to 6 out of 10. Patient states that she is unable to take Depakote as she had previously gotten sick without medication. Patient did also receive Decadron in the ER which further improved her symptoms. Review of Systems   Review of Systems   Constitutional:  Negative for activity change, fatigue and fever. HENT:  Negative for tinnitus and trouble swallowing. Eyes:  Positive for photophobia.  Negative for visual disturbance. Respiratory:  Negative for cough and shortness of breath. Cardiovascular:  Negative for chest pain and palpitations. Gastrointestinal:  Negative for diarrhea, nausea and vomiting. Genitourinary:  Negative for dysuria and flank pain. Musculoskeletal:  Negative for neck pain and neck stiffness. Skin:  Negative for color change and rash. Neurological:  Positive for headaches. Negative for dizziness, facial asymmetry, speech difficulty, weakness and numbness. Psychiatric/Behavioral:  Negative for agitation and confusion. Medications   Scheduled Meds:   Continuous Infusions:   PRN Meds:     Past History    Past Medical History:   has a past medical history of Anxiety, Asthma, Back pain, chronic, Bipolar 1 disorder ([address] Bronchitis, Cancer ([address] COPD (chronic obstructive pulmonary disease) ([address] DDD (degenerative disc disease), lumbar, Degenerative disc disease, cervical, Depression, Diverticulosis, Headache(784.0), History of blood transfusion, History of lobectomy of lung, Hyperlipidemia, Lung mass, Manic depression ([address] Pneumothorax, Sinus infection, and Ulcer. Social History:   reports that she has been smoking cigarettes. She started smoking about 38 years ago. She has a 15.00 pack-year smoking history. She has never used smokeless tobacco. She reports that she does not currently use drugs. She reports that she does not drink alcohol.      Family History:   Family History   Problem Relation Age of Onset    Asthma Maternal Grandmother     Other Maternal Grandmother         hiatal hernia    Asthma Maternal Grandfather         only 1 lung    Heart Disease Maternal Grandfather     COPD Mother     Heart Disease Mother     High Cholesterol Mother     High Blood Pressure Mother     Colon Polyps Mother 64    Cervical Cancer Sister 27    Cervical Cancer Sister 27       Physical Examination      Vitals:  /83   Pulse 61   Temp 98.6 °F (37 °C) (Oral)   Resp 18   Ht 5' (1.524 m) Wt 175 lb (79.4 kg)   SpO2 96%   BMI 34.18 kg/m²   Temp (24hrs), Av.6 °F (37 °C), Min:98.6 °F (37 °C), Max:98.6 °F (37 °C)      I/O (24Hr): No intake or output data in the 24 hours ending 22 2238    Physical Exam  Vitals reviewed. Constitutional:       Appearance: Normal appearance. HENT:      Head: Normocephalic and atraumatic. Right Ear: External ear normal.      Left Ear: External ear normal.      Nose: Nose normal.      Mouth/Throat:      Mouth: Mucous membranes are moist.      Pharynx: Oropharynx is clear. Eyes:      Extraocular Movements: Extraocular movements intact and EOM normal.      Pupils: Pupils are equal, round, and reactive to light. Cardiovascular:      Rate and Rhythm: Normal rate and regular rhythm. Pulmonary:      Effort: Pulmonary effort is normal. No respiratory distress. Abdominal:      General: There is no distension. Palpations: Abdomen is soft. Tenderness: There is no abdominal tenderness. Musculoskeletal:         General: No deformity or signs of injury. Cervical back: No rigidity or tenderness. Skin:     General: Skin is warm and dry. Neurological:      Mental Status: She is alert and oriented to person, place, and time. Coordination: Finger-Nose-Finger Test and Heel to Dilip Durie Test normal.      Deep Tendon Reflexes: Strength normal.      Reflex Scores:       Bicep reflexes are 2+ on the right side and 2+ on the left side. Brachioradialis reflexes are 2+ on the right side and 2+ on the left side. Patellar reflexes are 2+ on the right side and 2+ on the left side. Psychiatric:         Mood and Affect: Mood normal.         Speech: Speech normal.         Behavior: Behavior normal.         Thought Content: Thought content normal.     Neurologic Exam     Mental Status   Oriented to person, place, and time. Follows 2 step commands.    Attention: normal.   Speech: speech is normal   Level of consciousness: alert  Knowledge: good.   Able to name object. Able to read. Able to repeat. Cranial Nerves     CN II   Visual fields full to confrontation. CN III, IV, VI   Pupils are equal, round, and reactive to light. Extraocular motions are normal.     CN V   Facial sensation intact. CN VII   Facial expression full, symmetric. CN VIII   CN VIII normal.   Hearing: intact    CN IX, X   CN IX normal.   Palate: symmetric    CN XI   CN XI normal.   Right sternocleidomastoid strength: normal  Right trapezius strength: normal    CN XII   CN XII normal.   Tongue: not atrophic    Motor Exam   Muscle bulk: normal  Overall muscle tone: normal    Strength   Strength 5/5 throughout. Sensory Exam   Light touch normal.     Gait, Coordination, and Reflexes     Coordination   Finger to nose coordination: normal  Heel to shin coordination: normal    Reflexes   Right brachioradialis: 2+  Left brachioradialis: 2+  Right biceps: 2+  Left biceps: 2+  Right patellar: 2+  Left patellar: 2+     Labs/Imaging/Diagnostics   Labs:  CBC:  Recent Labs     08/01/22  1620   WBC 10.5   RBC 4.38   HGB 13.9   HCT 40.6   MCV 92.7        CHEMISTRIES:  Recent Labs     08/01/22  1620      K 3.8      CO2 22*   BUN 14   CREATININE 0.8   GLUCOSE 86     PT/INR:No results for input(s): PROTIME, INR in the last 72 hours. APTT:No results for input(s): APTT in the last 72 hours. LIVER PROFILE:  Recent Labs     08/01/22  1620   AST 17   ALT 15   BILITOT 0.2*   ALKPHOS 134*       Imaging Last 24 Hours:  CTA HEAD W WO CONTRAST    Result Date: 8/1/2022  CT angiography using IV contrast. 3-D postprocessing. Comparison: CT,SR - CT HEAD WO CONTRAST - 08/01/2022 05:01 PM EDT Findings: The intracranial carotid arteries are patent. There is a patent stent at the right carotid siphon. No aneurysm coils or clips. The vertebral basilar system is patent. Cerebral and cerebellar arteries are patent.  Anterior to the right carotid siphon stenosis on image 185 series 504, there is a curvilinear calcification noted. There is no contrast filling of an aneurysm in this area. There is no intracranial abnormal contrast enhancement. Impression: 1. Patent CTA head. This document has been electronically signed by: Leslie Staples MD on 08/01/2022 05:55 PM All CTs at this facility use dose modulation techniques and iterative reconstructions, and/or weight-based dosing when appropriate to reduce radiation to a low as reasonably achievable. 3D Post-processing was performed on this study. CT Head WO Contrast    Result Date: 8/1/2022  CT head without contrast Comparison: None Findings: No intra-axial mass, midline shift, hydrocephalus, or acute hemorrhage. No significant atrophy-like change or white matter disease. There is no sinus or mastoid fluid. The orbits are unremarkable. No skull fracture. 1. No acute intracranial findings This document has been electronically signed by: Leslie Staples MD on 08/01/2022 05:36 PM All CTs at this facility use dose modulation techniques and iterative reconstructions, and/or weight-based dosing when appropriate to reduce radiation to a low as reasonably achievable. CTA NECK W WO CONTRAST    Result Date: 8/1/2022  CT angiography neck with contrast. 3D Postprocessing. Comparison: None Findings: Aortic arch and cervical great vessels are patent. Thyroid gland unremarkable. No cervical mass or fluid collection. Mild emphysema and atelectasis of the upper lungs. No acute fracture. Patent neck CTA. This document has been electronically signed by: Leslie Staples MD on 08/01/2022 05:55 PM All CTs at this facility use dose modulation techniques and iterative reconstructions, and/or weight-based dosing when appropriate to reduce radiation to a low as reasonably achievable. Carotid stenosis and measurements are in accordance with NASCET criteria. 3D Post-processing was performed on this study.     XR CHEST PORTABLE    Result Date: 8/1/2022  PROCEDURE: XR CHEST PORTABLE CLINICAL INFORMATION: cp . TECHNIQUE: Portable upright COMPARISON: 1/8/2014 FINDINGS: Heart and mediastinal hilar contours are normal. No infiltrates or effusions. Vessels are not congested. Postsurgical changes right upper lobe. Chronic pleural thickening right lateral base. EKG leads overlie the chest.     Stable chest no acute cardiopulmonary disease **This report has been created using voice recognition software. It may contain minor errors which are inherent in voice recognition technology. ** Final report electronically signed by Dr. Channing Olmedo on 8/1/2022 4:54 PM        Assessment and Plan:          Migraine headache  CT head no acute process  CTA head and neck- no significant stenosis, no aneurysm identified in posterior circulation  Migraine cocktail in the Ed with improvement  Declined Depkote, got sick on it in the past. Not in allergies. Received decadron in the ED with further improvement  OK for discharge from neuro perspective. Follow up with outpatient neuro for migraine management  Follow up with outpatient neurointervention clinic in Nov for aneurysm management. This case was discussed with Dr. Crow Henry and he is in agreement with the assessment and plan.       Electronically signed by Una Laughlin PA-C on 8/1/22 at 10:38 PM EDT

## 2022-09-07 ENCOUNTER — APPOINTMENT (OUTPATIENT)
Dept: CT IMAGING | Age: 52
End: 2022-09-07
Payer: COMMERCIAL

## 2022-09-07 ENCOUNTER — HOSPITAL ENCOUNTER (EMERGENCY)
Age: 52
Discharge: HOME | End: 2022-09-07
Attending: EMERGENCY MEDICINE
Payer: COMMERCIAL

## 2022-09-07 ENCOUNTER — TELEPHONE (OUTPATIENT)
Dept: NEUROLOGY | Age: 52
End: 2022-09-07

## 2022-09-07 VITALS
TEMPERATURE: 97.9 F | HEIGHT: 60 IN | DIASTOLIC BLOOD PRESSURE: 92 MMHG | HEART RATE: 55 BPM | RESPIRATION RATE: 14 BRPM | BODY MASS INDEX: 33.38 KG/M2 | WEIGHT: 170 LBS | OXYGEN SATURATION: 99 % | SYSTOLIC BLOOD PRESSURE: 121 MMHG

## 2022-09-07 DIAGNOSIS — G43.011 INTRACTABLE MIGRAINE WITHOUT AURA AND WITH STATUS MIGRAINOSUS: Primary | ICD-10-CM

## 2022-09-07 LAB
ANION GAP SERPL CALCULATED.3IONS-SCNC: 8 MEQ/L (ref 8–16)
BASOPHILS # BLD: 0.4 %
BASOPHILS ABSOLUTE: 0 THOU/MM3 (ref 0–0.1)
BUN BLDV-MCNC: 13 MG/DL (ref 7–22)
CALCIUM SERPL-MCNC: 8.6 MG/DL (ref 8.5–10.5)
CHLORIDE BLD-SCNC: 109 MEQ/L (ref 98–111)
CO2: 23 MEQ/L (ref 23–33)
CREAT SERPL-MCNC: 0.7 MG/DL (ref 0.4–1.2)
EOSINOPHIL # BLD: 1.2 %
EOSINOPHILS ABSOLUTE: 0.1 THOU/MM3 (ref 0–0.4)
ERYTHROCYTE [DISTWIDTH] IN BLOOD BY AUTOMATED COUNT: 12.7 % (ref 11.5–14.5)
ERYTHROCYTE [DISTWIDTH] IN BLOOD BY AUTOMATED COUNT: 43.4 FL (ref 35–45)
GFR SERPL CREATININE-BSD FRML MDRD: 87 ML/MIN/1.73M2
GLUCOSE BLD-MCNC: 92 MG/DL (ref 70–108)
HCT VFR BLD CALC: 38.6 % (ref 37–47)
HEMOGLOBIN: 12.9 GM/DL (ref 12–16)
IMMATURE GRANS (ABS): 0.08 THOU/MM3 (ref 0–0.07)
IMMATURE GRANULOCYTES: 0.9 %
LYMPHOCYTES # BLD: 26.1 %
LYMPHOCYTES ABSOLUTE: 2.5 THOU/MM3 (ref 1–4.8)
MCH RBC QN AUTO: 31.3 PG (ref 26–33)
MCHC RBC AUTO-ENTMCNC: 33.4 GM/DL (ref 32.2–35.5)
MCV RBC AUTO: 93.7 FL (ref 81–99)
MONOCYTES # BLD: 9.7 %
MONOCYTES ABSOLUTE: 0.9 THOU/MM3 (ref 0.4–1.3)
NUCLEATED RED BLOOD CELLS: 0 /100 WBC
OSMOLALITY CALCULATION: 279.2 MOSMOL/KG (ref 275–300)
PLATELET # BLD: 347 THOU/MM3 (ref 130–400)
PMV BLD AUTO: 10.5 FL (ref 9.4–12.4)
POTASSIUM REFLEX MAGNESIUM: 4 MEQ/L (ref 3.5–5.2)
RBC # BLD: 4.12 MILL/MM3 (ref 4.2–5.4)
SEG NEUTROPHILS: 61.7 %
SEGMENTED NEUTROPHILS ABSOLUTE COUNT: 5.8 THOU/MM3 (ref 1.8–7.7)
SODIUM BLD-SCNC: 140 MEQ/L (ref 135–145)
WBC # BLD: 9.4 THOU/MM3 (ref 4.8–10.8)

## 2022-09-07 PROCEDURE — 70450 CT HEAD/BRAIN W/O DYE: CPT

## 2022-09-07 PROCEDURE — 6360000004 HC RX CONTRAST MEDICATION: Performed by: EMERGENCY MEDICINE

## 2022-09-07 PROCEDURE — 6360000002 HC RX W HCPCS: Performed by: EMERGENCY MEDICINE

## 2022-09-07 PROCEDURE — 96374 THER/PROPH/DIAG INJ IV PUSH: CPT

## 2022-09-07 PROCEDURE — 36415 COLL VENOUS BLD VENIPUNCTURE: CPT

## 2022-09-07 PROCEDURE — 96375 TX/PRO/DX INJ NEW DRUG ADDON: CPT

## 2022-09-07 PROCEDURE — 85025 COMPLETE CBC W/AUTO DIFF WBC: CPT

## 2022-09-07 PROCEDURE — 80048 BASIC METABOLIC PNL TOTAL CA: CPT

## 2022-09-07 PROCEDURE — 2580000003 HC RX 258: Performed by: EMERGENCY MEDICINE

## 2022-09-07 PROCEDURE — 96372 THER/PROPH/DIAG INJ SC/IM: CPT

## 2022-09-07 PROCEDURE — 6370000000 HC RX 637 (ALT 250 FOR IP): Performed by: EMERGENCY MEDICINE

## 2022-09-07 PROCEDURE — 70496 CT ANGIOGRAPHY HEAD: CPT

## 2022-09-07 PROCEDURE — 99285 EMERGENCY DEPT VISIT HI MDM: CPT

## 2022-09-07 PROCEDURE — 70498 CT ANGIOGRAPHY NECK: CPT

## 2022-09-07 RX ORDER — 0.9 % SODIUM CHLORIDE 0.9 %
1000 INTRAVENOUS SOLUTION INTRAVENOUS ONCE
Status: COMPLETED | OUTPATIENT
Start: 2022-09-07 | End: 2022-09-07

## 2022-09-07 RX ORDER — PROCHLORPERAZINE EDISYLATE 5 MG/ML
10 INJECTION INTRAMUSCULAR; INTRAVENOUS ONCE
Status: COMPLETED | OUTPATIENT
Start: 2022-09-07 | End: 2022-09-07

## 2022-09-07 RX ORDER — SUMATRIPTAN 6 MG/.5ML
6 INJECTION, SOLUTION SUBCUTANEOUS ONCE
Status: COMPLETED | OUTPATIENT
Start: 2022-09-07 | End: 2022-09-07

## 2022-09-07 RX ORDER — DIPHENHYDRAMINE HYDROCHLORIDE 50 MG/ML
12.5 INJECTION INTRAMUSCULAR; INTRAVENOUS ONCE
Status: COMPLETED | OUTPATIENT
Start: 2022-09-07 | End: 2022-09-07

## 2022-09-07 RX ORDER — SUMATRIPTAN 100 MG/1
100 TABLET, FILM COATED ORAL
Qty: 9 TABLET | Refills: 0 | Status: SHIPPED | OUTPATIENT
Start: 2022-09-07 | End: 2022-09-07

## 2022-09-07 RX ADMIN — PROCHLORPERAZINE EDISYLATE 10 MG: 5 INJECTION INTRAMUSCULAR; INTRAVENOUS at 09:50

## 2022-09-07 RX ADMIN — DIPHENHYDRAMINE HYDROCHLORIDE 12.5 MG: 50 INJECTION INTRAMUSCULAR; INTRAVENOUS at 09:49

## 2022-09-07 RX ADMIN — SODIUM CHLORIDE 1000 ML: 9 INJECTION, SOLUTION INTRAVENOUS at 09:52

## 2022-09-07 RX ADMIN — SUMATRIPTAN 6 MG: 6 INJECTION, SOLUTION SUBCUTANEOUS at 09:50

## 2022-09-07 RX ADMIN — IOPAMIDOL 80 ML: 755 INJECTION, SOLUTION INTRAVENOUS at 09:55

## 2022-09-07 ASSESSMENT — PAIN DESCRIPTION - LOCATION: LOCATION: HEAD

## 2022-09-07 ASSESSMENT — PAIN SCALES - GENERAL: PAINLEVEL_OUTOF10: 9

## 2022-09-07 ASSESSMENT — PAIN - FUNCTIONAL ASSESSMENT: PAIN_FUNCTIONAL_ASSESSMENT: 0-10

## 2022-09-07 NOTE — ED TRIAGE NOTES
Presents to ED with c/o migraine that she woke up with. Alert and oriented. Respirations easy and unlabored.

## 2022-09-07 NOTE — DISCHARGE INSTRUCTIONS
You were seen for a migraine headache. Your aneurysm and stent are stable and patent. No evidence of obstruction or bleeding was found on evaluation today. I have refilled her Imitrex for her pharmacy. Please  the prescription when possible. Continue taking home medications as prescribed. Follow-up with your neurologist and PCP as needed.

## 2022-09-07 NOTE — ED PROVIDER NOTES
[facility] [address] [address] EMERGENCY DEPT    EMERGENCY MEDICINE     Pt Name: Carolina Dalton  MRN: [id number]  [place] 1970  Date of evaluation: 9/7/2022  Provider: Robert Cota MD,     CHIEF COMPLAINT       Chief Complaint   Patient presents with    Migraine       HISTORY OF PRESENT ILLNESS    Patito Dalton is a pleasant 47 y.o. female who presents to the emergency department from home for evaluation of a migraine. Patient states that she woke up secondary to her migraine. She has a history of an aneurysm with stent placement. She called her neurologist and they recommended that she come here for evaluation. Patient states that the headache is right-sided and radiates down the back of her head into her neck. She endorses photophobia, nausea, and vomiting. She did try Motrin prior to arrival however she has run out of her Imitrex. She is still taking her daily prophylactic Topamax. She denies any fever, chills, gesturing, chest pain, shortness of breath. Triage notes and Nursing notes were reviewed by myself. Any discrepancies are addressed above.     PAST MEDICAL HISTORY     Past Medical History:   Diagnosis Date    Anxiety     Asthma     Back pain, chronic     Bipolar 1 disorder ([address]     Bronchitis     Cancer ([address]     cervical    COPD (chronic obstructive pulmonary disease) (HCC)     DDD (degenerative disc disease), lumbar     Degenerative disc disease, cervical     Depression     Diverticulosis     Headache(784.0)     migraines    History of blood transfusion     pregnancy     History of lobectomy of lung 2015    Right upper lobe    Hyperlipidemia     Lung mass 2017    left    Manic depression ([address]     Pneumothorax 2015    Right lung    Sinus infection     Ulcer        SURGICAL HISTORY       Past Surgical History:   Procedure Laterality Date    BRAIN ANEURYSM SURGERY  07/30/2021    opthalmic pipeline embolization    COLONOSCOPY  01/01/2013    ECTOPIC PREGNANCY SURGERY      HYSTERECTOMY (CERVIX STATUS UNKNOWN)      INCONTINENCE SURGERY      LUNG SURGERY      MOUTH SURGERY      MOUTH SURGERY      PLEURAL SCARIFICATION      right    SEPTOPLASTY  05/31/2013    Dr. Roland Quijano, Resection of [address] Inferior turbinate, Lt & Rt. anterior ethmoidectomy Rt. & Lt. frontal sinusotomy & biopsy, direct laryngoscopy, hypopharyngoscopy & biopsy                        SINUS SURGERY      TONSILLECTOMY  01/16/2014       CURRENT MEDICATIONS       Discharge Medication List as of 9/7/2022 11:24 AM        CONTINUE these medications which have NOT CHANGED    Details   topiramate (TOPAMAX) 25 MG tablet Take 50 mg by mouth 2 times dailyHistorical Med      !! SUMAtriptan (IMITREX) 100 MG tablet Take 100 mg by mouth once as needed for MigraineHistorical Med      gabapentin (NEURONTIN) 300 MG capsule Take 900 mg by mouth 3 times daily. 3 capsules of 300mg TIDHistorical Med      aspirin 81 MG EC tablet Take 81 mg by mouth daily. benzonatate (TESSALON) 100 MG capsule Take 100 mg by mouth 3 times daily as needed for Cough. Loratadine 10 MG CAPS Take  by mouth daily. omeprazole (PRILOSEC) 40 MG capsule Take 40 mg by mouth as needed. Historical Med       !! - Potential duplicate medications found. Please discuss with provider.           ALLERGIES     Codeine and Tramadol    FAMILY HISTORY       Family History   Problem Relation Age of Onset    Asthma Maternal Grandmother     Other Maternal Grandmother         hiatal hernia    Asthma Maternal Grandfather         only 1 lung    Heart Disease Maternal Grandfather     COPD Mother     Heart Disease Mother     High Cholesterol Mother     High Blood Pressure Mother     Colon Polyps Mother 64    Cervical Cancer Sister 27    Cervical Cancer Sister 27        SOCIAL HISTORY       Social History     Socioeconomic History    Marital status:      Spouse name: None    Number of children: None    Years of education: None    Highest education level: None   Tobacco Use    Smoking status: Every Day     Packs/day: 0.50     Years: 30.00     Pack years: 15.00     Types: Cigarettes     Start date: 6/9/1984    Smokeless tobacco: Never   Vaping Use    Vaping Use: Never used   Substance and Sexual Activity    Alcohol use: No    Drug use: Not Currently    Sexual activity: Not Currently       REVIEW OF SYSTEMS     Review of Systems   Constitutional:  Negative for fatigue and fever. HENT:  Negative for congestion and trouble swallowing. Eyes:  Positive for photophobia. Negative for redness. Respiratory:  Negative for cough and shortness of breath. Cardiovascular:  Negative for chest pain. Gastrointestinal:  Positive for nausea. Negative for abdominal pain and vomiting. Genitourinary:  Negative for dysuria. Musculoskeletal:  Negative for back pain. Skin:  Negative for rash. Allergic/Immunologic: Negative for immunocompromised state. Neurological:  Positive for headaches. Negative for light-headedness. Hematological:  Does not bruise/bleed easily. Except as noted above the remainder of the review of systems was reviewed and is. PHYSICAL EXAM    (up to 7 for level 4, 8 or more for level 5)     ED Triage Vitals [09/07/22 0927]   BP Temp Temp Source Heart Rate Resp SpO2 Height Weight   (!) 121/92 97.9 °F (36.6 °C) Oral 55 14 99 % 5' (1.524 m) 170 lb (77.1 kg)       Physical Exam  Vitals and nursing note reviewed. Exam conducted with a chaperone present. Constitutional:       General: She is not in acute distress. Appearance: She is normal weight. She is not ill-appearing. HENT:      Head: Normocephalic and atraumatic. No right periorbital erythema or left periorbital erythema. Comments: No temporal artery tenderness     Nose: Nose normal. No congestion. Mouth/Throat:      Mouth: Mucous membranes are moist.      Pharynx: Oropharynx is clear. No oropharyngeal exudate or posterior oropharyngeal erythema. Eyes:      Extraocular Movements: Extraocular movements intact. Pupils: Pupils are equal, round, and reactive to light. Neck:      Meningeal: Brudzinski's sign and Kernig's sign absent. Cardiovascular:      Rate and Rhythm: Normal rate and regular rhythm. Pulses: Normal pulses. Heart sounds: No murmur heard. No friction rub. Pulmonary:      Effort: Pulmonary effort is normal. No respiratory distress. Breath sounds: Normal breath sounds. No wheezing. Abdominal:      General: Abdomen is flat. There is no distension. Palpations: Abdomen is soft. Tenderness: There is no abdominal tenderness. There is no guarding or rebound. Musculoskeletal:         General: Normal range of motion. Cervical back: No spinous process tenderness or muscular tenderness. Skin:     General: Skin is warm and dry. Capillary Refill: Capillary refill takes less than 2 seconds. Neurological:      General: No focal deficit present. Mental Status: She is alert and oriented to person, place, and time. GCS: GCS eye subscore is 4. GCS verbal subscore is 5. GCS motor subscore is 6. Cranial Nerves: No facial asymmetry. Sensory: Sensation is intact. Motor: Motor function is intact. DIAGNOSTIC RESULTS     EKG:(none if blank)  All EKG's are [facility] who either signs or Co-signs this chart in the absence of a cardiologist.        RADIOLOGY: (none if blank)   Interpretation per the Radiologistbelow, if available at the time of this note:    CT Head WO Contrast   Final Result   Ethmoid sinus disease. No acute intracranial pathology. **This report has been created using voice recognition software. It may contain minor errors which are inherent in voice recognition technology. **      Final report electronically signed by Dr. Yoselyn Bradshaw on 9/7/2022 10:16 AM      CTA HEAD W WO CONTRAST   Final Result       1.  Redemonstration of a pipeline stent in the right internal carotid artery in the or Management Options  Intractable migraine without aura and with status migrainosus  Diagnosis management comments: 79-year-old female presents emergency room for headache. Differential includes migraine, tension headache, aneurysm rupture, stent occlusion, viral syndrome. We will reach out to neurology for further recommendations. We will start with IV fluids, Compazine, and Benadryl for symptomatic relief. Neurology is recommending that we get CT head and CTAs to ensure that her aneurysm is stable and the stent is patent.    /  ED Course as of 09/09/22 0517   Wed Sep 07, 2022   1044 CTA IMPRESSION:     1. Redemonstration of a pipeline stent in the right internal carotid artery in the cavernous and supraclinoid segments without evidence of residual or recurrent aneurysm. No flow-limiting stenosis is identified in the intracranial circulation. 2. Mild calcified mural plaque at the right carotid bulb without hemodynamically significant stenosis by NASCET criteria. [DD]   [id number] Patient is feeling better after medication. I will refill her Imitrex for her. She is agreeable for discharge. [DD]      ED Course User Index  [DD] Darylene Babinski, MD         The patient was evaluated during the global COVID-19 pandemic, and that diagnosis was considered upon their initial presentation. Their evaluation, treatment and testing was consistent with current guidelines for patients who present with complaints or symptoms that may be related to COVID-19.     Strict returnprecautions and follow up instructions were discussed with the patient with which the patient agrees        ED Medications administered this visit:    Medications   0.9 % sodium chloride bolus (0 mLs IntraVENous Stopped 9/7/22 1154)   prochlorperazine (COMPAZINE) injection 10 mg (10 mg IntraVENous Given 9/7/22 0950)   SUMAtriptan (IMITREX) injection 6 mg (6 mg SubCUTAneous Given 9/7/22 0950)   diphenhydrAMINE (BENADRYL) injection 12.5 mg (12.5 mg IntraVENous Given 9/7/22 0949)   iopamidol (ISOVUE-370) 76 % injection 80 mL (80 mLs IntraVENous Given 9/7/22 0955)         Procedures: (None if blank)       CLINICAL       1. Intractable migraine without aura and with status migrainosus          DISPOSITION/PLAN   DISPOSITION Decision To Discharge 09/07/2022 11:22:47 AM      PATIENT REFERRED TO:  Taz Mo, APRN - CNP  [address] [address]  [phone]    Schedule an appointment as soon as possible for a visit   If symptoms worsen    DISCHARGE MEDICATIONS:  Discharge Medication List as of 9/7/2022 11:24 AM        START taking these medications    Details   !! SUMAtriptan (IMITREX) 100 MG tablet Take 1 tablet by mouth once as needed for Migraine, Disp-9 tablet, R-0Normal       !! - Potential duplicate medications found. Please discuss with provider.                  (Please note that portions of this note were completed with a voice recognition program.  Efforts were made to edit the dictations but occasionallywords are mis-transcribed.)      Electronically signed by Sandra Reynoso MD on 9/7/22 at 9:45 AM EDT    Attending Physician, Emergency Department         Darylene Babinski, MD  09/09/22 7113

## 2022-09-07 NOTE — LETTER
[facility] [address] [address] EMERGENCY DEPT  [address] [place] [address]  Phone: [phone]             September 7, 2022    Patient: Josias Dalton   YOB: 1970   Date of Visit: 9/7/2022       To Whom It May Concern:    Patito Dalton was seen and treated in our emergency department on 9/7/2022. She may return to work on 9/9/2022.       Sincerely,             Signature:__________________________________

## 2022-09-09 ASSESSMENT — ENCOUNTER SYMPTOMS
COUGH: 0
BACK PAIN: 0
ABDOMINAL PAIN: 0
NAUSEA: 1
TROUBLE SWALLOWING: 0
SHORTNESS OF BREATH: 0
EYE REDNESS: 0
PHOTOPHOBIA: 1
VOMITING: 0

## 2022-10-12 ENCOUNTER — TELEPHONE (OUTPATIENT)
Dept: NEUROLOGY | Age: 52
End: 2022-10-12

## 2022-10-12 DIAGNOSIS — G43.011 MIGRAINE WITHOUT AURA, WITH INTRACTABLE MIGRAINE, SO STATED, WITH STATUS MIGRAINOSUS: Primary | ICD-10-CM

## 2022-10-12 NOTE — TELEPHONE ENCOUNTER
Pt called asking if Dr. Susan Stauffer could do intervention on her aneurysm. She stated she has been having migraines (and aneurysms were reassessed in the ED along with having repeat CTAs). She stated she has had much anxiety since being diagnosed and has someone who was close to her have an aneurysm rupture and is not doing well. Per Dr. Susan Stauffer, he reiterated aneurysm is not treatable due to the size and description as is noted in the procedure note from previous DSA. Also per Dr. Susan Stauffer, Referral to General Neurology placed for Migraine Management. Pt notified and expressed understanding. Does patient still need CTAs in November before follow up? Pt had CTAs in September when seen in ED.

## 2022-11-08 ENCOUNTER — OFFICE VISIT (OUTPATIENT)
Dept: NEUROLOGY | Age: 52
End: 2022-11-08
Payer: COMMERCIAL

## 2022-11-08 VITALS
HEART RATE: 62 BPM | SYSTOLIC BLOOD PRESSURE: 122 MMHG | BODY MASS INDEX: 30.9 KG/M2 | DIASTOLIC BLOOD PRESSURE: 78 MMHG | OXYGEN SATURATION: 100 % | WEIGHT: 157.4 LBS | HEIGHT: 60 IN

## 2022-11-08 DIAGNOSIS — I67.1 CEREBRAL ANEURYSM: Primary | ICD-10-CM

## 2022-11-08 DIAGNOSIS — G43.011 MIGRAINE WITHOUT AURA, WITH INTRACTABLE MIGRAINE, SO STATED, WITH STATUS MIGRAINOSUS: ICD-10-CM

## 2022-11-08 PROCEDURE — G8417 CALC BMI ABV UP PARAM F/U: HCPCS | Performed by: PHYSICIAN ASSISTANT

## 2022-11-08 PROCEDURE — G8427 DOCREV CUR MEDS BY ELIG CLIN: HCPCS | Performed by: PHYSICIAN ASSISTANT

## 2022-11-08 PROCEDURE — 99213 OFFICE O/P EST LOW 20 MIN: CPT | Performed by: PHYSICIAN ASSISTANT

## 2022-11-08 PROCEDURE — 4004F PT TOBACCO SCREEN RCVD TLK: CPT | Performed by: PHYSICIAN ASSISTANT

## 2022-11-08 PROCEDURE — 3017F COLORECTAL CA SCREEN DOC REV: CPT | Performed by: PHYSICIAN ASSISTANT

## 2022-11-08 PROCEDURE — G8484 FLU IMMUNIZE NO ADMIN: HCPCS | Performed by: PHYSICIAN ASSISTANT

## 2022-11-08 RX ORDER — TOPIRAMATE 25 MG/1
75 TABLET ORAL 2 TIMES DAILY
Qty: 180 TABLET | Refills: 3 | Status: SHIPPED | OUTPATIENT
Start: 2022-11-08

## 2022-11-08 NOTE — PROGRESS NOTES
Neurointerventional Progress Note    Date:11/8/2022         Patient Name:Patito Dalton     YOB: 1970     Age:52 y.o. Reason for Visit: 6-month follow-up    Chief Complaint: History of right ICA aneurysm, basilar left PCA junction aneurysm    Subjective     Patito Dalton is a 47 y.o. female who presents to the interventional neurology clinic for evaluation and management of multiple cerebral aneurysms. The patient was initially seen by our service in April and underwent a diagnostic cerebral angiogram to evaluate a right internal carotid artery aneurysm which was treated with a flow diverter device. During that angiogram, which occurred in May, the patient's right ICA aneurysm was completely healed, but a new very small aneurysm was visualized at the junction of the left PCA and basilar artery. She presents today for follow-up. She reports that she has been feeling relatively anxious recently and lost a friend about a week ago. She is having frequent migraine headaches and states she is using her Imitrex approximately every few days. No other neurologic complaints at this time. Review of Systems   Review of Systems   Eyes:  Negative for visual disturbance. Neurological:  Positive for headaches. Negative for dizziness, facial asymmetry, speech difficulty, weakness, light-headedness and numbness. Medications     Current Outpatient Medications on File Prior to Visit   Medication Sig Dispense Refill    SUMAtriptan (IMITREX) 100 MG tablet Take 1 tablet by mouth once as needed for Migraine 9 tablet 0    topiramate (TOPAMAX) 25 MG tablet Take 50 mg by mouth 2 times daily      SUMAtriptan (IMITREX) 100 MG tablet Take 100 mg by mouth once as needed for Migraine      gabapentin (NEURONTIN) 300 MG capsule Take 900 mg by mouth 3 times daily. 3 capsules of 300mg TID      aspirin 81 MG EC tablet Take 81 mg by mouth daily.       benzonatate (TESSALON) 100 MG capsule Take 100 mg by mouth 3 times daily as needed for Cough. Loratadine 10 MG CAPS Take  by mouth daily. omeprazole (PRILOSEC) 40 MG capsule Take 40 mg by mouth as needed. No current facility-administered medications on file prior to visit. Past History    Past Medical History:   has a past medical history of Anxiety, Asthma, Back pain, chronic, Bipolar 1 disorder ([address] Bronchitis, Cancer ([address] COPD (chronic obstructive pulmonary disease) ([address] DDD (degenerative disc disease), lumbar, Degenerative disc disease, cervical, Depression, Diverticulosis, Headache(784.0), History of blood transfusion, History of lobectomy of lung, Hyperlipidemia, Lung mass, Manic depression ([facility] Pneumothorax, Sinus infection, and Ulcer. Social History:   reports that she has been smoking cigarettes. She started smoking about 38 years ago. She has a 15.00 pack-year smoking history. She has never used smokeless tobacco. She reports that she does not currently use drugs. She reports that she does not drink alcohol. Family History:   Family History   Problem Relation Age of Onset    Asthma Maternal Grandmother     Other Maternal Grandmother         hiatal hernia    Asthma Maternal Grandfather         only 1 lung    Heart Disease Maternal Grandfather     COPD Mother     Heart Disease Mother     High Cholesterol Mother     High Blood Pressure Mother     Colon Polyps Mother 64    Cervical Cancer Sister 27    Cervical Cancer Sister 27       Physical Examination      Vitals: There were no vitals taken for this visit. Physical Exam  Vitals reviewed. Constitutional:       General: She is not in acute distress. Appearance: Normal appearance. She is not ill-appearing. HENT:      Head: Normocephalic and atraumatic.       Right Ear: External ear normal.      Left Ear: External ear normal.      Nose: Nose normal.      Mouth/Throat:      Mouth: Mucous membranes are moist.      Pharynx: No oropharyngeal exudate or posterior oropharyngeal erythema. Eyes:      Extraocular Movements: EOM normal.      Pupils: Pupils are equal, round, and reactive to light. Musculoskeletal:         General: Normal range of motion. Right lower leg: No edema. Left lower leg: No edema. Skin:     General: Skin is warm. Findings: No rash. Neurological:      Mental Status: She is alert and oriented to person, place, and time. Coordination: Finger-Nose-Finger Test and Heel to [employer] normal.   Psychiatric:         Mood and Affect: Mood normal.         Speech: Speech normal.         Behavior: Behavior normal.     Neurologic Exam     Mental Status   Oriented to person, place, and time. Attention: normal. Concentration: normal.   Speech: speech is normal   Level of consciousness: alert  Normal comprehension. Cranial Nerves     CN II   Visual fields full to confrontation. Right visual field deficit: none  Left visual field deficit: none     CN III, IV, VI   Pupils are equal, round, and reactive to light. Extraocular motions are normal.   Right pupil: Shape: regular. Reactivity: brisk. Left pupil: Shape: regular. Reactivity: brisk. CN V   Facial sensation intact. Right facial sensation deficit: none  Left facial sensation deficit: none    CN VII   Facial expression full, symmetric.    Right facial weakness: none  Left facial weakness: none    CN VIII   CN VIII normal.   Hearing: intact    CN IX, X   CN IX normal.   CN X normal.   Palate: symmetric    CN XI   CN XI normal.   Right trapezius strength: normal  Left trapezius strength: normal    CN XII   CN XII normal.   Tongue: not atrophic  Fasciculations: absent  Tongue deviation: none    Motor Exam   Muscle bulk: normal  Overall muscle tone: normal  Right arm tone: normal  Left arm tone: normal  Right arm pronator drift: absent  Left arm pronator drift: absent  Right leg tone: normal  Left leg tone: normal  Muscle strength 5/5 in bilateral upper and lower extremities     Sensory Exam Light touch normal.     Gait, Coordination, and Reflexes     Coordination   Finger to nose coordination: normal  Heel to shin coordination: normal     Imaging   CT from September reviewed. Aneurysm the junction of basilar left PCA not well visualized. No residual swelling noted of right ICA aneurysm. Assessment and Plan:        Right ICA aneurysm status post embolization with flow diverter device. Left PCA/basilar junction aneurysm measuring 1 x 1.9 mm. Smoking cessation discussed at length with patient  Work restrictions. No heavy lifting greater than 25 pounds. No given. Continue following with PCP regarding risk factor control. Follow-up in interventional neurology clinic in 6 months for a diagnostic cerebral angiogram with Dr. Phillip Arreaga to evaluate aneurysm    2. Migraine headaches  Increase Topamax to 75 mg twice daily  Outpatient referral to Dr. Norma Partida for migraine management    This patient was seen and evaluated with Dr. Funmi Galvez and he is in agreement with the assessment and plan.     Electronically signed by Sid Ramon PA-C on 11/8/22 at 1:37 PM EST

## 2022-11-10 ENCOUNTER — TELEPHONE (OUTPATIENT)
Dept: NEUROLOGY | Age: 52
End: 2022-11-10

## 2022-11-10 NOTE — TELEPHONE ENCOUNTER
Axel Salamanca RN from [employer] (patient's employer) called regarding letter of weight restrictions requesting an ending date for limitations. Agreed to need extension of limitations December 2023. Pt notified.

## 2022-11-23 ENCOUNTER — TELEPHONE (OUTPATIENT)
Dept: NEUROLOGY | Age: 52
End: 2022-11-23

## 2022-11-23 NOTE — TELEPHONE ENCOUNTER
----- Message from [address] sent at 11/15/2022  6:43 AM EST -----  Regarding: Work restrictions  Can the restriction go from 25 pounds to 26 pounds my job is on the line over 1 pound

## 2022-11-23 NOTE — TELEPHONE ENCOUNTER
Hope,    I have the updated letter to send to [facility]. I just need a fax number to send it to them. I tried calling you and your voicemail was full. Please contact me. I will be in the office today until 430pm and Friday until noon.     Archana Duenas, [address] [place] [address] Road

## 2023-01-23 ENCOUNTER — HOSPITAL ENCOUNTER (OUTPATIENT)
Age: 53
Setting detail: SPECIMEN
Discharge: HOME | End: 2023-01-23

## 2023-01-23 LAB
ABSOLUTE EOS #: 0.23 K/UL (ref 0–0.44)
ABSOLUTE IMMATURE GRANULOCYTE: 0.09 K/UL (ref 0–0.3)
ABSOLUTE LYMPH #: 3.83 K/UL (ref 1.1–3.7)
ABSOLUTE MONO #: 0.9 K/UL (ref 0.1–1.2)
ALBUMIN SERPL-MCNC: 4.6 G/DL (ref 3.5–5.2)
ALBUMIN/GLOBULIN RATIO: 1.7 (ref 1–2.5)
ALP BLD-CCNC: 114 U/L (ref 35–104)
ALT SERPL-CCNC: 15 U/L (ref 5–33)
ANION GAP SERPL CALCULATED.3IONS-SCNC: 9 MMOL/L (ref 9–17)
AST SERPL-CCNC: 18 U/L
BASOPHILS # BLD: 1 % (ref 0–2)
BASOPHILS ABSOLUTE: 0.09 K/UL (ref 0–0.2)
BILIRUB SERPL-MCNC: 0.2 MG/DL (ref 0.3–1.2)
BUN BLDV-MCNC: 17 MG/DL (ref 6–20)
CALCIUM SERPL-MCNC: 9.7 MG/DL (ref 8.6–10.4)
CHLORIDE BLD-SCNC: 103 MMOL/L (ref 98–107)
CHOLESTEROL/HDL RATIO: 5.1
CHOLESTEROL: 240 MG/DL
CO2: 25 MMOL/L (ref 20–31)
CREAT SERPL-MCNC: 0.77 MG/DL (ref 0.5–0.9)
EOSINOPHILS RELATIVE PERCENT: 2 % (ref 1–4)
GFR SERPL CREATININE-BSD FRML MDRD: >60 ML/MIN/1.73M2
GLUCOSE BLD-MCNC: 81 MG/DL (ref 70–99)
HCT VFR BLD CALC: 43.5 % (ref 36.3–47.1)
HDLC SERPL-MCNC: 47 MG/DL
HEMOGLOBIN: 14.7 G/DL (ref 11.9–15.1)
IMMATURE GRANULOCYTES: 1 %
LDL CHOLESTEROL: 170 MG/DL (ref 0–130)
LYMPHOCYTES # BLD: 34 % (ref 24–43)
MCH RBC QN AUTO: 31.5 PG (ref 25.2–33.5)
MCHC RBC AUTO-ENTMCNC: 33.8 G/DL (ref 28.4–34.8)
MCV RBC AUTO: 93.1 FL (ref 82.6–102.9)
MONOCYTES # BLD: 8 % (ref 3–12)
NRBC AUTOMATED: 0 PER 100 WBC
PDW BLD-RTO: 12.4 % (ref 11.8–14.4)
PLATELET # BLD: 339 K/UL (ref 138–453)
PMV BLD AUTO: 11.1 FL (ref 8.1–13.5)
POTASSIUM SERPL-SCNC: 4.3 MMOL/L (ref 3.7–5.3)
RBC # BLD: 4.67 M/UL (ref 3.95–5.11)
SEG NEUTROPHILS: 54 % (ref 36–65)
SEGMENTED NEUTROPHILS ABSOLUTE COUNT: 6.19 K/UL (ref 1.5–8.1)
SODIUM BLD-SCNC: 137 MMOL/L (ref 135–144)
TOTAL PROTEIN: 7.3 G/DL (ref 6.4–8.3)
TRIGL SERPL-MCNC: 114 MG/DL
WBC # BLD: 11.3 K/UL (ref 3.5–11.3)

## 2023-02-10 ENCOUNTER — HOSPITAL ENCOUNTER (OUTPATIENT)
Dept: NON INVASIVE DIAGNOSTICS | Age: 53
Discharge: HOME | End: 2023-02-10
Payer: COMMERCIAL

## 2023-02-10 DIAGNOSIS — R01.1 CARDIAC MURMUR: ICD-10-CM

## 2023-02-10 LAB
LV EF: 60 %
LVEF MODALITY: NORMAL

## 2023-02-10 PROCEDURE — 93306 TTE W/DOPPLER COMPLETE: CPT

## 2023-02-13 ENCOUNTER — TELEPHONE (OUTPATIENT)
Dept: NEUROLOGY | Age: 53
End: 2023-02-13

## 2023-02-13 NOTE — TELEPHONE ENCOUNTER
Patient called this morning explaining she is to have a surgery to remove and/or break up kidney stones, specifically of the right kidney. She stated Dr. Madeline Murguia will not proceed with surgery without clearance from PCP and Neuro Vascular physicians, specifically from an aneurysm standpoint. Spoke with Albaro Rosales from [facility] with Dr. Madeline Murguia and she stated per anesthesiologist, patient must have clearance from Dr. Marcello Ricardo to proceed with surgery on 2-. Due to Dr. Marcello Ricardo leaving off rotation and not currently in the [facility] area, I was able to get a verbal clearance from Dr. Marcello Ricardo. Per Dr. Marcello Ricardo verbal consent:    \"May proceed but avoid stopping aspirin if possible, or minimal interruption to aspirin\".

## 2023-02-13 NOTE — TELEPHONE ENCOUNTER
Spoke with Crispin Young at Dr. Arthur Boas and explained to her I have verbal consent from Dr. Nikki Sneed enabling patient to proceed with surgery on 2-. Explained I would send encounter with verbal consent along with last office note in November.

## 2023-03-21 ENCOUNTER — PATIENT MESSAGE (OUTPATIENT)
Dept: NEUROLOGY | Age: 53
End: 2023-03-21

## 2023-04-27 ENCOUNTER — HOSPITAL ENCOUNTER (OUTPATIENT)
Dept: CT IMAGING | Age: 53
Discharge: HOME | End: 2023-04-27
Payer: COMMERCIAL

## 2023-04-27 DIAGNOSIS — I72.5 BASILAR ARTERY ANEURYSM (HCC): ICD-10-CM

## 2023-04-27 PROCEDURE — 6360000004 HC RX CONTRAST MEDICATION: Performed by: PHYSICIAN ASSISTANT

## 2023-04-27 PROCEDURE — 70496 CT ANGIOGRAPHY HEAD: CPT

## 2023-04-27 RX ADMIN — IOPAMIDOL 80 ML: 755 INJECTION, SOLUTION INTRAVENOUS at 15:05

## 2023-04-28 ENCOUNTER — TELEPHONE (OUTPATIENT)
Dept: NEUROLOGY | Age: 53
End: 2023-04-28

## 2023-05-03 ENCOUNTER — TELEPHONE (OUTPATIENT)
Dept: NEUROLOGY | Age: 53
End: 2023-05-03

## 2023-05-15 ENCOUNTER — HOSPITAL ENCOUNTER (EMERGENCY)
Age: 53
Discharge: HOME | End: 2023-05-15
Attending: EMERGENCY MEDICINE
Payer: COMMERCIAL

## 2023-05-15 VITALS
HEART RATE: 75 BPM | DIASTOLIC BLOOD PRESSURE: 65 MMHG | WEIGHT: 165 LBS | SYSTOLIC BLOOD PRESSURE: 101 MMHG | OXYGEN SATURATION: 97 % | HEIGHT: 60 IN | RESPIRATION RATE: 16 BRPM | TEMPERATURE: 97.8 F | BODY MASS INDEX: 32.39 KG/M2

## 2023-05-15 DIAGNOSIS — K04.7 DENTAL INFECTION: ICD-10-CM

## 2023-05-15 DIAGNOSIS — R11.2 NAUSEA AND VOMITING, UNSPECIFIED VOMITING TYPE: Primary | ICD-10-CM

## 2023-05-15 PROCEDURE — 2580000003 HC RX 258: Performed by: STUDENT IN AN ORGANIZED HEALTH CARE EDUCATION/TRAINING PROGRAM

## 2023-05-15 PROCEDURE — 6360000002 HC RX W HCPCS: Performed by: STUDENT IN AN ORGANIZED HEALTH CARE EDUCATION/TRAINING PROGRAM

## 2023-05-15 PROCEDURE — 99284 EMERGENCY DEPT VISIT MOD MDM: CPT

## 2023-05-15 PROCEDURE — 96365 THER/PROPH/DIAG IV INF INIT: CPT

## 2023-05-15 PROCEDURE — 96375 TX/PRO/DX INJ NEW DRUG ADDON: CPT

## 2023-05-15 PROCEDURE — 6370000000 HC RX 637 (ALT 250 FOR IP): Performed by: STUDENT IN AN ORGANIZED HEALTH CARE EDUCATION/TRAINING PROGRAM

## 2023-05-15 PROCEDURE — 93010 ELECTROCARDIOGRAM REPORT: CPT | Performed by: INTERNAL MEDICINE

## 2023-05-15 PROCEDURE — 93005 ELECTROCARDIOGRAM TRACING: CPT | Performed by: EMERGENCY MEDICINE

## 2023-05-15 RX ORDER — ONDANSETRON 2 MG/ML
4 INJECTION INTRAMUSCULAR; INTRAVENOUS ONCE
Status: DISCONTINUED | OUTPATIENT
Start: 2023-05-15 | End: 2023-05-15

## 2023-05-15 RX ORDER — 0.9 % SODIUM CHLORIDE 0.9 %
1000 INTRAVENOUS SOLUTION INTRAVENOUS ONCE
Status: COMPLETED | OUTPATIENT
Start: 2023-05-15 | End: 2023-05-15

## 2023-05-15 RX ORDER — PROCHLORPERAZINE EDISYLATE 5 MG/ML
10 INJECTION INTRAMUSCULAR; INTRAVENOUS ONCE
Status: COMPLETED | OUTPATIENT
Start: 2023-05-15 | End: 2023-05-15

## 2023-05-15 RX ORDER — HYDROCODONE BITARTRATE AND ACETAMINOPHEN 5; 325 MG/1; MG/1
1 TABLET ORAL ONCE
Status: COMPLETED | OUTPATIENT
Start: 2023-05-15 | End: 2023-05-15

## 2023-05-15 RX ORDER — MORPHINE SULFATE 4 MG/ML
4 INJECTION, SOLUTION INTRAMUSCULAR; INTRAVENOUS ONCE
Status: COMPLETED | OUTPATIENT
Start: 2023-05-15 | End: 2023-05-15

## 2023-05-15 RX ORDER — ONDANSETRON 4 MG/1
4 TABLET, ORALLY DISINTEGRATING ORAL 3 TIMES DAILY PRN
Qty: 21 TABLET | Refills: 0 | Status: SHIPPED | OUTPATIENT
Start: 2023-05-15

## 2023-05-15 RX ORDER — HYDROCODONE BITARTRATE AND ACETAMINOPHEN 5; 325 MG/1; MG/1
1 TABLET ORAL EVERY 6 HOURS PRN
Qty: 10 TABLET | Refills: 0 | Status: SHIPPED | OUTPATIENT
Start: 2023-05-15 | End: 2023-05-18

## 2023-05-15 RX ORDER — AMOXICILLIN AND CLAVULANATE POTASSIUM 875; 125 MG/1; MG/1
1 TABLET, FILM COATED ORAL 2 TIMES DAILY
Qty: 14 TABLET | Refills: 0 | Status: SHIPPED | OUTPATIENT
Start: 2023-05-15 | End: 2023-05-22

## 2023-05-15 RX ADMIN — MORPHINE SULFATE 4 MG: 4 INJECTION, SOLUTION INTRAMUSCULAR; INTRAVENOUS at 01:01

## 2023-05-15 RX ADMIN — SODIUM CHLORIDE 1000 ML: 9 INJECTION, SOLUTION INTRAVENOUS at 01:02

## 2023-05-15 RX ADMIN — PROCHLORPERAZINE EDISYLATE 10 MG: 5 INJECTION INTRAMUSCULAR; INTRAVENOUS at 01:44

## 2023-05-15 RX ADMIN — HYDROCODONE BITARTRATE AND ACETAMINOPHEN 1 TABLET: 5; 325 TABLET ORAL at 02:08

## 2023-05-15 RX ADMIN — SODIUM CHLORIDE 3000 MG: 900 INJECTION INTRAVENOUS at 01:26

## 2023-05-15 ASSESSMENT — PAIN DESCRIPTION - FREQUENCY: FREQUENCY: CONTINUOUS

## 2023-05-15 ASSESSMENT — PAIN SCALES - GENERAL
PAINLEVEL_OUTOF10: 8
PAINLEVEL_OUTOF10: 7
PAINLEVEL_OUTOF10: 7
PAINLEVEL_OUTOF10: 8

## 2023-05-15 ASSESSMENT — PAIN DESCRIPTION - PAIN TYPE
TYPE: ACUTE PAIN
TYPE: ACUTE PAIN

## 2023-05-15 ASSESSMENT — PAIN DESCRIPTION - ORIENTATION
ORIENTATION: RIGHT;LOWER
ORIENTATION: RIGHT
ORIENTATION: RIGHT;LOWER
ORIENTATION: RIGHT;LOWER

## 2023-05-15 ASSESSMENT — PAIN DESCRIPTION - LOCATION
LOCATION: TEETH

## 2023-05-15 ASSESSMENT — PAIN - FUNCTIONAL ASSESSMENT
PAIN_FUNCTIONAL_ASSESSMENT: 0-10
PAIN_FUNCTIONAL_ASSESSMENT: 0-10

## 2023-05-15 NOTE — ED NOTES
Pt to ED with [facility] for complaints of nausea with emesis since yesterday. Pt states ongoing treatment for infected right lower tooth and taking Amoxicillin. Pt states right sided chest pain during EKG, states no further symptoms or complaints. No distress noted, pt breaths easy and unlabored.        [facility]  05/15/23 0028

## 2023-05-15 NOTE — ED PROVIDER NOTES
[facility] [address] [address] EMERGENCY DEPT      EMERGENCY MEDICINE     Pt Name: Hu Dalton  MRN: [id number]  [place] 1970  Date of evaluation: 5/15/2023  Provider: Gisele Sandoval MD  Supervising Physician: [address] [place] [address]       Chief Complaint   Patient presents with    Nausea    Emesis     History obtained from the patient. HISTORY OF PRESENT ILLNESS   Patito Dalton is a pleasant 46 y.o. female who presents to the emergency department from from home, brought in by EMS for evaluation of nausea and vomiting. Patient was in her normal state of health until 1 week prior to presentation, when she broke a lower right molar. Pt endorses having worsening tenderness, and pain in the area surrounding the tooth, extending along jaw and posterior to the R ear, along the mandibular angle to the chin, and down the R SCM muscle. Patient woke up this morning feeling very sick, nauseous and vomiting multiple times throughout the day, and feeling lightheaded especially upon standing up. This prompted her to go to Urgent care, where she was prescribed amoxicillin. She took a dose, but symptoms persisted and patient decided to call EMS. En route, she received zofran x1, and was hypotensive to the high 42Y systolic. Patient denies diarrhea, headache, photophobia, chest pain. Denies any additional complaints. Pertinent ROS included above.   PASTMEDICAL HISTORY     Past Medical History:   Diagnosis Date    Anxiety     Asthma     Back pain, chronic     Bipolar 1 disorder (HCC)     Bronchitis     Cancer (HCC)     cervical    COPD (chronic obstructive pulmonary disease) (HCC)     DDD (degenerative disc disease), lumbar     Degenerative disc disease, cervical     Depression     Diverticulosis     Headache(784.0)     migraines    History of blood transfusion     pregnancy     History of lobectomy of lung 2015    Right upper lobe    Hyperlipidemia     Lung mass 2017    left    Manic depression ([facility] [address]     Pneumothorax 2015

## 2023-05-15 NOTE — DISCHARGE INSTRUCTIONS
Today you were seen for nausea, vomiting, dental pain. At this time you are stable for discharge. Please take medications as prescribed. Return to ED if any worsening of condition. Follow-up with dentist as soon as possible.

## 2023-05-18 LAB
EKG ATRIAL RATE: 69 BPM
EKG P AXIS: 25 DEGREES
EKG P-R INTERVAL: 150 MS
EKG Q-T INTERVAL: 390 MS
EKG QRS DURATION: 78 MS
EKG QTC CALCULATION (BAZETT): 417 MS
EKG R AXIS: 19 DEGREES
EKG T AXIS: 38 DEGREES
EKG VENTRICULAR RATE: 69 BPM

## 2023-05-30 ENCOUNTER — OFFICE VISIT (OUTPATIENT)
Dept: NEUROLOGY | Age: 53
End: 2023-05-30
Payer: COMMERCIAL

## 2023-05-30 VITALS
SYSTOLIC BLOOD PRESSURE: 102 MMHG | HEIGHT: 60 IN | BODY MASS INDEX: 31.41 KG/M2 | WEIGHT: 160 LBS | OXYGEN SATURATION: 96 % | HEART RATE: 71 BPM | DIASTOLIC BLOOD PRESSURE: 68 MMHG

## 2023-05-30 DIAGNOSIS — I67.1 CEREBRAL ANEURYSM: Primary | ICD-10-CM

## 2023-05-30 DIAGNOSIS — G43.011 MIGRAINE WITHOUT AURA, WITH INTRACTABLE MIGRAINE, SO STATED, WITH STATUS MIGRAINOSUS: ICD-10-CM

## 2023-05-30 PROCEDURE — G8427 DOCREV CUR MEDS BY ELIG CLIN: HCPCS

## 2023-05-30 PROCEDURE — 4004F PT TOBACCO SCREEN RCVD TLK: CPT

## 2023-05-30 PROCEDURE — G8417 CALC BMI ABV UP PARAM F/U: HCPCS

## 2023-05-30 PROCEDURE — 3017F COLORECTAL CA SCREEN DOC REV: CPT

## 2023-05-30 PROCEDURE — 99213 OFFICE O/P EST LOW 20 MIN: CPT

## 2023-05-30 RX ORDER — AMOXICILLIN 500 MG/1
500 CAPSULE ORAL 3 TIMES DAILY
COMMUNITY

## 2023-05-30 ASSESSMENT — ENCOUNTER SYMPTOMS
NAUSEA: 0
RHINORRHEA: 0
VOMITING: 0
SORE THROAT: 0
ABDOMINAL PAIN: 0
SHORTNESS OF BREATH: 0
COUGH: 0

## 2023-05-30 NOTE — PROGRESS NOTES
Neurointerventional Progress Note    Date:5/30/2023        Patient Name:Patito Dalton     YOB: 1970     Age:53 y.o. Reason for Visit:   6-month follow-up      Chief Complaint: History of right ICA aneurysm, basilar left PCA junction aneurysm       Subjective       Patito Dalton is a 47year old female presenting to the interventional neurology clinic as a 6 month follow up for the evaluation of multiple cerebral aneurysms. Patient was previously seen in May and underwent a diagnostic cerebral angiogram to evaluate a right ICA aneurysm treated with a flow diverter device. Patient had this done in [place] and had several questions on it. She states that the device used was recalled and has been trying to get the model number without luck. Patient was educated that the flow diverter has successfully treated her aneurysm. During the angiogram, the patient's right ICA aneurysm was found to be completely healed, but a new small aneurysm was noticed at the junction of the left PCA and basilar artery. Upon review of images with Dr. Leopoldo Mott, he felt as though that this aneurysm found was likely not new, but just hasn't been caught on previous imaging. Patient remains asymptomatic with a stable CTA. Today she presents for a 6 month follow up. Patient states that she has no new symptoms at this time. She does state that she does still get headaches frequently, last visit we referred her to Dr. Jenniffer Yadav and increased her Topamax, patient does admit that she never followed up with Dr. Jenniffer Yadav. Other than headaches, patient denies any other neurologic symptoms. Denies any blurry vision, weakness, speech difficulty. Patient does still admit to smoking cigarettes. Review of Systems   Review of Systems   Constitutional:  Negative for chills and fever. HENT:  Negative for rhinorrhea and sore throat. Eyes:  Negative for visual disturbance.    Respiratory:  Negative for cough and shortness of

## 2023-06-20 ENCOUNTER — TELEPHONE (OUTPATIENT)
Dept: NEUROLOGY | Age: 53
End: 2023-06-20

## 2023-08-09 ENCOUNTER — OFFICE VISIT (OUTPATIENT)
Dept: NEUROLOGY | Age: 53
End: 2023-08-09
Payer: COMMERCIAL

## 2023-08-09 VITALS
OXYGEN SATURATION: 97 % | BODY MASS INDEX: 29.45 KG/M2 | SYSTOLIC BLOOD PRESSURE: 130 MMHG | WEIGHT: 150 LBS | HEART RATE: 76 BPM | DIASTOLIC BLOOD PRESSURE: 70 MMHG | HEIGHT: 60 IN

## 2023-08-09 DIAGNOSIS — G44.89 OTHER HEADACHE SYNDROME: Primary | ICD-10-CM

## 2023-08-09 PROCEDURE — 3017F COLORECTAL CA SCREEN DOC REV: CPT | Performed by: PSYCHIATRY & NEUROLOGY

## 2023-08-09 PROCEDURE — 4004F PT TOBACCO SCREEN RCVD TLK: CPT | Performed by: PSYCHIATRY & NEUROLOGY

## 2023-08-09 PROCEDURE — G8427 DOCREV CUR MEDS BY ELIG CLIN: HCPCS | Performed by: PSYCHIATRY & NEUROLOGY

## 2023-08-09 PROCEDURE — 99205 OFFICE O/P NEW HI 60 MIN: CPT | Performed by: PSYCHIATRY & NEUROLOGY

## 2023-08-09 PROCEDURE — G8417 CALC BMI ABV UP PARAM F/U: HCPCS | Performed by: PSYCHIATRY & NEUROLOGY

## 2023-08-09 RX ORDER — ZONISAMIDE 100 MG/1
100 CAPSULE ORAL NIGHTLY
Qty: 30 CAPSULE | Refills: 3 | Status: SHIPPED | OUTPATIENT
Start: 2023-08-09

## 2023-08-09 NOTE — PATIENT INSTRUCTIONS
MRI brain WO contrast  Stop Imitrex  Slowly wean off Topamax as discussed  Start Zonegran 100 mg nightly, take with plenty of fluids  Start Nurtec 75 mg daily as needed for breakthrough headache, samples provided today in office  Keep headache diary  Call with any new symptoms or concerns.    Follow up in 6 weeks

## 2023-08-16 NOTE — PROGRESS NOTES
in office  Keep headache diary  Call with any new symptoms or concerns.    Follow up in 6 weeks    Total time 64 min    Karen Greene MD

## 2023-08-23 ENCOUNTER — TELEPHONE (OUTPATIENT)
Dept: NEUROLOGY | Age: 53
End: 2023-08-23

## 2023-08-23 NOTE — TELEPHONE ENCOUNTER
The MRI brain was denied. A P2P can be performed @ [phone] tracking# [id number].  Pt is currently on the schedule today 8/23 @ 4:30

## 2023-08-31 NOTE — TELEPHONE ENCOUNTER
Please have the provider complete the P2P as the MRI brain was denied and pt currently on the schedule 9/7

## 2023-09-05 NOTE — TELEPHONE ENCOUNTER
Different insurance has different guidelines.  If the provider is unable to perform the P2P then the MRI will need canceled as this will be uncovered for the pt

## 2023-11-01 ENCOUNTER — HOSPITAL ENCOUNTER (EMERGENCY)
Age: 53
Discharge: HOME | End: 2023-11-01
Attending: EMERGENCY MEDICINE
Payer: COMMERCIAL

## 2023-11-01 ENCOUNTER — TELEPHONE (OUTPATIENT)
Dept: NEUROLOGY | Age: 53
End: 2023-11-01

## 2023-11-01 ENCOUNTER — APPOINTMENT (OUTPATIENT)
Dept: CT IMAGING | Age: 53
End: 2023-11-01
Payer: COMMERCIAL

## 2023-11-01 VITALS
HEART RATE: 62 BPM | RESPIRATION RATE: 17 BRPM | BODY MASS INDEX: 28.32 KG/M2 | OXYGEN SATURATION: 98 % | WEIGHT: 145 LBS | TEMPERATURE: 98.4 F | SYSTOLIC BLOOD PRESSURE: 127 MMHG | DIASTOLIC BLOOD PRESSURE: 95 MMHG

## 2023-11-01 DIAGNOSIS — G43.909 MIGRAINE WITHOUT STATUS MIGRAINOSUS, NOT INTRACTABLE, UNSPECIFIED MIGRAINE TYPE: Primary | ICD-10-CM

## 2023-11-01 LAB
ANION GAP SERPL CALC-SCNC: 9 MEQ/L (ref 8–16)
BASOPHILS ABSOLUTE: 0.1 THOU/MM3 (ref 0–0.1)
BASOPHILS NFR BLD AUTO: 0.7 %
BUN SERPL-MCNC: 14 MG/DL (ref 7–22)
CALCIUM SERPL-MCNC: 9.5 MG/DL (ref 8.5–10.5)
CHLORIDE SERPL-SCNC: 102 MEQ/L (ref 98–111)
CO2 SERPL-SCNC: 27 MEQ/L (ref 23–33)
CREAT SERPL-MCNC: 0.7 MG/DL (ref 0.4–1.2)
DEPRECATED RDW RBC AUTO: 42.5 FL (ref 35–45)
EOSINOPHIL NFR BLD AUTO: 1.4 %
EOSINOPHILS ABSOLUTE: 0.2 THOU/MM3 (ref 0–0.4)
ERYTHROCYTE [DISTWIDTH] IN BLOOD BY AUTOMATED COUNT: 12.5 % (ref 11.5–14.5)
GFR SERPL CREATININE-BSD FRML MDRD: > 60 ML/MIN/1.73M2
GLUCOSE SERPL-MCNC: 92 MG/DL (ref 70–108)
HCT VFR BLD AUTO: 46.3 % (ref 37–47)
HGB BLD-MCNC: 15.3 GM/DL (ref 12–16)
IMM GRANULOCYTES # BLD AUTO: 0.08 THOU/MM3 (ref 0–0.07)
IMM GRANULOCYTES NFR BLD AUTO: 0.7 %
LYMPHOCYTES ABSOLUTE: 2.7 THOU/MM3 (ref 1–4.8)
LYMPHOCYTES NFR BLD AUTO: 23.9 %
MCH RBC QN AUTO: 30.8 PG (ref 26–33)
MCHC RBC AUTO-ENTMCNC: 33 GM/DL (ref 32.2–35.5)
MCV RBC AUTO: 93.3 FL (ref 81–99)
MONOCYTES ABSOLUTE: 0.9 THOU/MM3 (ref 0.4–1.3)
MONOCYTES NFR BLD AUTO: 7.9 %
NEUTROPHILS NFR BLD AUTO: 65.4 %
NRBC BLD AUTO-RTO: 0 /100 WBC
OSMOLALITY SERPL CALC.SUM OF ELEC: 275.8 MOSMOL/KG (ref 275–300)
PLATELET # BLD AUTO: 346 THOU/MM3 (ref 130–400)
PMV BLD AUTO: 10.6 FL (ref 9.4–12.4)
POTASSIUM SERPL-SCNC: 4.3 MEQ/L (ref 3.5–5.2)
RBC # BLD AUTO: 4.96 MILL/MM3 (ref 4.2–5.4)
SEGMENTED NEUTROPHILS ABSOLUTE COUNT: 7.3 THOU/MM3 (ref 1.8–7.7)
SODIUM SERPL-SCNC: 138 MEQ/L (ref 135–145)
WBC # BLD AUTO: 11.1 THOU/MM3 (ref 4.8–10.8)

## 2023-11-01 PROCEDURE — 96375 TX/PRO/DX INJ NEW DRUG ADDON: CPT

## 2023-11-01 PROCEDURE — 6360000002 HC RX W HCPCS: Performed by: EMERGENCY MEDICINE

## 2023-11-01 PROCEDURE — 96374 THER/PROPH/DIAG INJ IV PUSH: CPT

## 2023-11-01 PROCEDURE — 85025 COMPLETE CBC W/AUTO DIFF WBC: CPT

## 2023-11-01 PROCEDURE — 6360000004 HC RX CONTRAST MEDICATION: Performed by: EMERGENCY MEDICINE

## 2023-11-01 PROCEDURE — 80048 BASIC METABOLIC PNL TOTAL CA: CPT

## 2023-11-01 PROCEDURE — 70496 CT ANGIOGRAPHY HEAD: CPT

## 2023-11-01 PROCEDURE — 99285 EMERGENCY DEPT VISIT HI MDM: CPT

## 2023-11-01 PROCEDURE — 70450 CT HEAD/BRAIN W/O DYE: CPT

## 2023-11-01 PROCEDURE — 2580000003 HC RX 258: Performed by: EMERGENCY MEDICINE

## 2023-11-01 PROCEDURE — 36415 COLL VENOUS BLD VENIPUNCTURE: CPT

## 2023-11-01 RX ORDER — DIPHENHYDRAMINE HYDROCHLORIDE 50 MG/ML
25 INJECTION INTRAMUSCULAR; INTRAVENOUS ONCE
Status: COMPLETED | OUTPATIENT
Start: 2023-11-01 | End: 2023-11-01

## 2023-11-01 RX ORDER — DIPHENHYDRAMINE HYDROCHLORIDE 50 MG/ML
25 INJECTION INTRAMUSCULAR; INTRAVENOUS ONCE
Status: DISCONTINUED | OUTPATIENT
Start: 2023-11-01 | End: 2023-11-01

## 2023-11-01 RX ORDER — 0.9 % SODIUM CHLORIDE 0.9 %
1000 INTRAVENOUS SOLUTION INTRAVENOUS ONCE
Status: COMPLETED | OUTPATIENT
Start: 2023-11-01 | End: 2023-11-01

## 2023-11-01 RX ORDER — KETOROLAC TROMETHAMINE 30 MG/ML
15 INJECTION, SOLUTION INTRAMUSCULAR; INTRAVENOUS ONCE
Status: COMPLETED | OUTPATIENT
Start: 2023-11-01 | End: 2023-11-01

## 2023-11-01 RX ORDER — KETOROLAC TROMETHAMINE 30 MG/ML
15 INJECTION, SOLUTION INTRAMUSCULAR; INTRAVENOUS ONCE
Status: DISCONTINUED | OUTPATIENT
Start: 2023-11-01 | End: 2023-11-01

## 2023-11-01 RX ORDER — PROCHLORPERAZINE EDISYLATE 5 MG/ML
10 INJECTION INTRAMUSCULAR; INTRAVENOUS ONCE
Status: DISCONTINUED | OUTPATIENT
Start: 2023-11-01 | End: 2023-11-01

## 2023-11-01 RX ORDER — 0.9 % SODIUM CHLORIDE 0.9 %
1000 INTRAVENOUS SOLUTION INTRAVENOUS ONCE
Status: DISCONTINUED | OUTPATIENT
Start: 2023-11-01 | End: 2023-11-01

## 2023-11-01 RX ORDER — PROCHLORPERAZINE EDISYLATE 5 MG/ML
10 INJECTION INTRAMUSCULAR; INTRAVENOUS ONCE
Status: COMPLETED | OUTPATIENT
Start: 2023-11-01 | End: 2023-11-01

## 2023-11-01 RX ADMIN — DIPHENHYDRAMINE HYDROCHLORIDE 25 MG: 50 INJECTION INTRAMUSCULAR; INTRAVENOUS at 18:29

## 2023-11-01 RX ADMIN — IOPAMIDOL 80 ML: 755 INJECTION, SOLUTION INTRAVENOUS at 17:06

## 2023-11-01 RX ADMIN — KETOROLAC TROMETHAMINE 15 MG: 30 INJECTION, SOLUTION INTRAMUSCULAR; INTRAVENOUS at 18:29

## 2023-11-01 RX ADMIN — PROCHLORPERAZINE EDISYLATE 10 MG: 5 INJECTION INTRAMUSCULAR; INTRAVENOUS at 18:28

## 2023-11-01 RX ADMIN — SODIUM CHLORIDE 1000 ML: 9 INJECTION, SOLUTION INTRAVENOUS at 18:27

## 2023-11-01 ASSESSMENT — PAIN - FUNCTIONAL ASSESSMENT
PAIN_FUNCTIONAL_ASSESSMENT: 0-10
PAIN_FUNCTIONAL_ASSESSMENT: 0-10

## 2023-11-01 ASSESSMENT — PAIN SCALES - GENERAL
PAINLEVEL_OUTOF10: 9
PAINLEVEL_OUTOF10: 6

## 2023-11-01 NOTE — TELEPHONE ENCOUNTER
Patient is here in office and states she has been having headaches for four days. She says nothing works for it, she has tried excedrin, aleve, tylenol and zonisamide. She states nothing is helping. They headache is from temple to temple all the way across the top of her head. Constant pounding and throbbing. She states it is not the worst headache of her life, but pretty close. She was potentially asking about getting imaging done sooner, which she is due for a CTA in May, but [facility] did not order that. I did recommend patient to go to the ED right now to be evaluated.

## 2023-11-01 NOTE — ED TRIAGE NOTES
Pt arrives to ED  from home with c/o headache that has been ongoing for 3 days. Pt states she has history of aneurysm, pt states she called their office and they advised her to come be seen in ED. Pt states it has been a while since her last scan, states she is concerned because over the last day she has become more dizzy and stumbles some.

## 2023-11-01 NOTE — ED NOTES
Pt states \"I'm hungry and I'm ready to go home. \" Provider notified.      Phyllis Urbano RN  11/01/23 6176

## 2023-11-01 NOTE — ED PROVIDER NOTES
[facility] EMERGENCY DEPT      CHIEF COMPLAINT       Chief Complaint   Patient presents with    Headache    Hx aneurysm        Nurses Notes reviewed and I agree except as noted in the HPI. HISTORY OF PRESENT ILLNESS    Patito Dalton is a 46 y.o. female who presents with complaint of headache, stated that she has a frontal migraine headache. States that she has a brain aneurysm, is concerned that it could be leaking/ruptured. Rena Mad that she follows up with neurology on outpatient basis, last CT was in April. Onset: Acute on chronic  Duration: 4 days  Timing: Persistent  Location of Pain: Frontal headache  Intesity/severity: Moderate pain  Modifying Factors:   Relieved by;  Previous Episodes; Tx Before arrival: None  REVIEW OF SYSTEMS        PAST MEDICAL HISTORY    has a past medical history of Anxiety, Asthma, Back pain, chronic, Bipolar 1 disorder ([address]), Bronchitis, Cancer ([address]), COPD (chronic obstructive pulmonary disease) ([address]), DDD (degenerative disc disease), lumbar, Degenerative disc disease, cervical, Depression, Diverticulosis, Headache(784.0), History of blood transfusion, History of lobectomy of lung, Hyperlipidemia, Lung mass, Manic depression ([address]), Pneumothorax, Sinus infection, and Ulcer. SURGICAL HISTORY      has a past surgical history that includes Pleural scarification; Hysterectomy; Ectopic pregnancy surgery; Mouth surgery; Incontinence surgery; Septoplasty (05/31/2013); Mouth surgery; Lung surgery; sinus surgery; Colonoscopy (01/01/2013); Tonsillectomy (01/16/2014); and Brain aneurysm surgery (07/30/2021).     CURRENT MEDICATIONS       Discharge Medication List as of 11/1/2023  6:47 PM        CONTINUE these medications which have NOT CHANGED    Details   zonisamide (ZONEGRAN) 100 MG capsule Take 1 capsule by mouth at bedtime Take with plenty of fluids, Disp-30 capsule, R-3Normal      ondansetron (ZOFRAN-ODT) 4 MG disintegrating tablet Take 1 tablet by mouth 3 times daily as needed for

## 2023-11-01 NOTE — ED NOTES
Patient resting in bed with eyes closed, respirations easy and unlabored. Lights dimmed and door closed for patient comfort. Call light in reach. Will continue to monitor.       Levi Urbano Terrace JewelsNatacha REDDING RN  11/01/23 4202

## 2023-11-01 NOTE — ED NOTES
Patient resting in bed. Respirations easy and unlabored. No distress noted. Call light within reach.      Billing, [address] Ene REDDING, RN  11/01/23 1183

## 2023-11-07 ENCOUNTER — OFFICE VISIT (OUTPATIENT)
Dept: NEUROLOGY | Age: 53
End: 2023-11-07
Payer: COMMERCIAL

## 2023-11-07 VITALS
HEART RATE: 61 BPM | DIASTOLIC BLOOD PRESSURE: 79 MMHG | HEIGHT: 60 IN | WEIGHT: 145 LBS | SYSTOLIC BLOOD PRESSURE: 121 MMHG | BODY MASS INDEX: 28.47 KG/M2

## 2023-11-07 DIAGNOSIS — I67.1 INTRACRANIAL ANEURYSM: Primary | ICD-10-CM

## 2023-11-07 PROCEDURE — 4004F PT TOBACCO SCREEN RCVD TLK: CPT | Performed by: NURSE PRACTITIONER

## 2023-11-07 PROCEDURE — G8484 FLU IMMUNIZE NO ADMIN: HCPCS | Performed by: NURSE PRACTITIONER

## 2023-11-07 PROCEDURE — 99213 OFFICE O/P EST LOW 20 MIN: CPT | Performed by: NURSE PRACTITIONER

## 2023-11-07 PROCEDURE — 3017F COLORECTAL CA SCREEN DOC REV: CPT | Performed by: NURSE PRACTITIONER

## 2023-11-07 PROCEDURE — G8427 DOCREV CUR MEDS BY ELIG CLIN: HCPCS | Performed by: NURSE PRACTITIONER

## 2023-11-07 PROCEDURE — G8417 CALC BMI ABV UP PARAM F/U: HCPCS | Performed by: NURSE PRACTITIONER

## 2023-11-07 NOTE — PROGRESS NOTES
Neurology Office Note    Date:11/7/2023        Patient Name:Patito Dalton     YOB: 1970     Age:53 y.o. Reason for Visit:   6 month follow up      Chief Complaint:   Chief Complaint   Patient presents with    Follow-up     Follow Up - Increased headaches. Went to ED, wants to follow up from that visit. Loosing hearing in left ear and vision in both eyes. Subjective       Patito Dalton is a 47year old female presenting to the interventional neurology clinic as a 6 month follow up for the evaluation of multiple cerebral aneurysms. Patient was seen in May 2022 and underwent a diagnostic cerebral angiogram to evaluate a right ICA aneurysm treated with a flow diverter device. Patient had this done in [place]. The patient's right ICA aneurysm was found to be completely healed, but a new small aneurysm was noticed at the junction of the left PCA and basilar artery. She is continuing with smoking, but reports she doesn't even smoke 1 cigarette a day. She does not drink alcohol. 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 She is still currently following with Dr. Yesica Andres and is currently on Topamax. She is still getting daily dull headaches. She was recently seen in the ER for this and her CT head and CTA head were stable. No change since 6 month ago study. She does report vision issues, in which one eye is near sided, one is far sided. She does not have any visual field cuts. She has not been to eye doctor in over a year. Review of Systems   Review of Systems   Eyes:  Positive for visual disturbance. Neurological:  Positive for dizziness, light-headedness and headaches. Negative for syncope, facial asymmetry, speech difficulty, weakness and numbness. Psychiatric/Behavioral:  Negative for confusion.         Medications   Home Meds:   Current Outpatient Medications on File Prior to Visit

## 2023-11-17 ENCOUNTER — HOSPITAL ENCOUNTER (EMERGENCY)
Age: 53
Discharge: HOME | End: 2023-11-17
Payer: COMMERCIAL

## 2023-11-17 ENCOUNTER — APPOINTMENT (OUTPATIENT)
Dept: GENERAL RADIOLOGY | Age: 53
End: 2023-11-17
Payer: COMMERCIAL

## 2023-11-17 VITALS
RESPIRATION RATE: 20 BRPM | TEMPERATURE: 97.8 F | OXYGEN SATURATION: 96 % | DIASTOLIC BLOOD PRESSURE: 74 MMHG | SYSTOLIC BLOOD PRESSURE: 104 MMHG | BODY MASS INDEX: 29.29 KG/M2 | WEIGHT: 150 LBS | HEART RATE: 62 BPM

## 2023-11-17 DIAGNOSIS — J44.1 COPD EXACERBATION (HCC): Primary | ICD-10-CM

## 2023-11-17 LAB
ANION GAP SERPL CALC-SCNC: 9 MEQ/L (ref 8–16)
BASOPHILS ABSOLUTE: 0.1 THOU/MM3 (ref 0–0.1)
BASOPHILS NFR BLD AUTO: 0.9 %
BUN SERPL-MCNC: 15 MG/DL (ref 7–22)
CALCIUM SERPL-MCNC: 9.4 MG/DL (ref 8.5–10.5)
CHLORIDE SERPL-SCNC: 104 MEQ/L (ref 98–111)
CO2 SERPL-SCNC: 24 MEQ/L (ref 23–33)
CREAT SERPL-MCNC: 0.6 MG/DL (ref 0.4–1.2)
DEPRECATED RDW RBC AUTO: 44.5 FL (ref 35–45)
EOSINOPHIL NFR BLD AUTO: 1.9 %
EOSINOPHILS ABSOLUTE: 0.2 THOU/MM3 (ref 0–0.4)
ERYTHROCYTE [DISTWIDTH] IN BLOOD BY AUTOMATED COUNT: 12.9 % (ref 11.5–14.5)
FLUAV RNA RESP QL NAA+PROBE: NOT DETECTED
FLUBV RNA RESP QL NAA+PROBE: NOT DETECTED
GFR SERPL CREATININE-BSD FRML MDRD: > 60 ML/MIN/1.73M2
GLUCOSE SERPL-MCNC: 79 MG/DL (ref 70–108)
HCT VFR BLD AUTO: 44.6 % (ref 37–47)
HGB BLD-MCNC: 14.5 GM/DL (ref 12–16)
IMM GRANULOCYTES # BLD AUTO: 0.04 THOU/MM3 (ref 0–0.07)
IMM GRANULOCYTES NFR BLD AUTO: 0.5 %
LYMPHOCYTES ABSOLUTE: 2.6 THOU/MM3 (ref 1–4.8)
LYMPHOCYTES NFR BLD AUTO: 32.4 %
MCH RBC QN AUTO: 30.6 PG (ref 26–33)
MCHC RBC AUTO-ENTMCNC: 32.5 GM/DL (ref 32.2–35.5)
MCV RBC AUTO: 94.1 FL (ref 81–99)
MONOCYTES ABSOLUTE: 1.1 THOU/MM3 (ref 0.4–1.3)
MONOCYTES NFR BLD AUTO: 13.7 %
NEUTROPHILS NFR BLD AUTO: 50.6 %
NRBC BLD AUTO-RTO: 0 /100 WBC
OSMOLALITY SERPL CALC.SUM OF ELEC: 273.6 MOSMOL/KG (ref 275–300)
PLATELET # BLD AUTO: 299 THOU/MM3 (ref 130–400)
PMV BLD AUTO: 10.4 FL (ref 9.4–12.4)
POTASSIUM SERPL-SCNC: 4.4 MEQ/L (ref 3.5–5.2)
PROCALCITONIN SERPL IA-MCNC: 0.04 NG/ML (ref 0.01–0.09)
RBC # BLD AUTO: 4.74 MILL/MM3 (ref 4.2–5.4)
SARS-COV-2 RNA RESP QL NAA+PROBE: NOT DETECTED
SEGMENTED NEUTROPHILS ABSOLUTE COUNT: 4 THOU/MM3 (ref 1.8–7.7)
SODIUM SERPL-SCNC: 137 MEQ/L (ref 135–145)
WBC # BLD AUTO: 7.9 THOU/MM3 (ref 4.8–10.8)

## 2023-11-17 PROCEDURE — 93005 ELECTROCARDIOGRAM TRACING: CPT | Performed by: STUDENT IN AN ORGANIZED HEALTH CARE EDUCATION/TRAINING PROGRAM

## 2023-11-17 PROCEDURE — 87636 SARSCOV2 & INF A&B AMP PRB: CPT

## 2023-11-17 PROCEDURE — 85025 COMPLETE CBC W/AUTO DIFF WBC: CPT

## 2023-11-17 PROCEDURE — 36415 COLL VENOUS BLD VENIPUNCTURE: CPT

## 2023-11-17 PROCEDURE — 93010 ELECTROCARDIOGRAM REPORT: CPT | Performed by: INTERNAL MEDICINE

## 2023-11-17 PROCEDURE — 84145 PROCALCITONIN (PCT): CPT

## 2023-11-17 PROCEDURE — 80048 BASIC METABOLIC PNL TOTAL CA: CPT

## 2023-11-17 PROCEDURE — 71046 X-RAY EXAM CHEST 2 VIEWS: CPT

## 2023-11-17 PROCEDURE — 99285 EMERGENCY DEPT VISIT HI MDM: CPT

## 2023-11-17 RX ORDER — AZITHROMYCIN 250 MG/1
TABLET, FILM COATED ORAL
Qty: 1 PACKET | Refills: 0 | Status: SHIPPED | OUTPATIENT
Start: 2023-11-17 | End: 2023-11-21

## 2023-11-17 RX ORDER — FLUTICASONE PROPIONATE 50 MCG
2 SPRAY, SUSPENSION (ML) NASAL DAILY
Qty: 16 G | Refills: 0 | Status: SHIPPED | OUTPATIENT
Start: 2023-11-17

## 2023-11-17 RX ORDER — PREDNISONE 10 MG/1
TABLET ORAL
Qty: 20 TABLET | Refills: 0 | Status: SHIPPED | OUTPATIENT
Start: 2023-11-17 | End: 2023-11-27

## 2023-11-17 RX ORDER — IPRATROPIUM BROMIDE AND ALBUTEROL SULFATE 2.5; .5 MG/3ML; MG/3ML
1 SOLUTION RESPIRATORY (INHALATION) ONCE
Status: DISCONTINUED | OUTPATIENT
Start: 2023-11-17 | End: 2023-11-17

## 2023-11-17 ASSESSMENT — PAIN SCALES - GENERAL: PAINLEVEL_OUTOF10: 8

## 2023-11-17 ASSESSMENT — PAIN DESCRIPTION - LOCATION: LOCATION: HEAD

## 2023-11-17 ASSESSMENT — PAIN - FUNCTIONAL ASSESSMENT: PAIN_FUNCTIONAL_ASSESSMENT: 0-10

## 2023-11-17 NOTE — ED TRIAGE NOTES
Patient to room 39 for complaint of not feeling well. Patient reports that she is feeling clammy and has had a cough and congestion. She was exposed to COVID around two weeks ago and also works in a nursing home. Patient states that she is having a lot of fatigue. EKG completed at bedside.

## 2023-11-17 NOTE — ED PROVIDER NOTES
[facility] Emergency Department    CHIEF COMPLAINT       Chief Complaint   Patient presents with    Shortness of Breath    Cough    Headache       Nurses Notes reviewed and I agree except as noted in the HPI. HISTORY OF PRESENT ILLNESS   Patito Dalton is a 46 y.o. female who presents to the ED for evaluation of shortness of breath cough and headache. Patient reports symptoms been ongoing for approximately 1 week, she notes that she has had decreased appetite, chills and sweats, she reports nasal congestion, rhinorrhea, sore throat. She notes that she has difficulty hearing due to plugged ears. She notes frequent cough, with some cloudy sputum, she notes wheezing and shortness of breath. She notes nausea, and one episode of vomiting. She denies any change in urination. She notes some lightheadedness. She reports history of COPD, emphysema, spontaneous pneumothorax. She notes that she also has a history of brain aneurysm, she has had 1 embolized, she has small notes under observation. She reports history of heart murmur. She notes a couple weeks ago she was around someone who had COVID-19 and pneumonia. 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 HPI was provided by the patient.        PAST MEDICAL HISTORY     Past Medical History:   Diagnosis Date    Anxiety     Asthma     Back pain, chronic     Bipolar 1 disorder ([address])     Bronchitis     Cancer ([address])     cervical    COPD (chronic obstructive pulmonary disease) (HCC)     DDD (degenerative disc disease), lumbar     Degenerative disc disease, cervical     Depression     Diverticulosis     Headache(784.0)     migraines    History of blood transfusion     pregnancy     History of lobectomy of lung 2015    Right upper lobe    Hyperlipidemia     Lung mass 2017    left    Manic depression ([address])     Pneumothorax 2015    Right lung    Sinus infection     Ulcer        SURGICALHISTORY      has a past surgical history that includes Pleural scarification; tablet, R-0Normal      fluticasone (FLONASE) 50 MCG/ACT nasal spray 2 sprays by Each Nostril route daily, Disp-16 g, R-0Normal             (Please note that portions of this note were completed with a voice recognition program.  Efforts were made to edit the dictations but occasionally words are mis-transcribed.)    Provider:  I personally performed the services described in the documentation,reviewed and edited the documentation which was dictated to the scribe in my presence, and it accurately records my words and actions.     Dexter Siddiqi CNP 11/17/23 8:47 PM    MAITE Chaves - Dedra Barrientos APRN - [facility]  11/17/23 [id number]

## 2023-11-19 LAB
EKG ATRIAL RATE: 66 BPM
EKG P AXIS: 68 DEGREES
EKG P-R INTERVAL: 160 MS
EKG Q-T INTERVAL: 402 MS
EKG QRS DURATION: 84 MS
EKG QTC CALCULATION (BAZETT): 421 MS
EKG R AXIS: 30 DEGREES
EKG T AXIS: 62 DEGREES
EKG VENTRICULAR RATE: 66 BPM

## 2023-12-03 DIAGNOSIS — G43.011 MIGRAINE WITHOUT AURA, WITH INTRACTABLE MIGRAINE, SO STATED, WITH STATUS MIGRAINOSUS: Primary | ICD-10-CM

## 2023-12-04 RX ORDER — ZONISAMIDE 100 MG/1
CAPSULE ORAL
Qty: 30 CAPSULE | Refills: 0 | Status: SHIPPED | OUTPATIENT
Start: 2023-12-04

## 2024-01-10 SDOH — HEALTH STABILITY: PHYSICAL HEALTH: ON AVERAGE, HOW MANY DAYS PER WEEK DO YOU ENGAGE IN MODERATE TO STRENUOUS EXERCISE (LIKE A BRISK WALK)?: 7 DAYS

## 2024-01-10 SDOH — HEALTH STABILITY: PHYSICAL HEALTH: ON AVERAGE, HOW MANY MINUTES DO YOU ENGAGE IN EXERCISE AT THIS LEVEL?: 150+ MIN

## 2024-01-11 ENCOUNTER — OFFICE VISIT (OUTPATIENT)
Dept: FAMILY MEDICINE CLINIC | Age: 54
End: 2024-01-11
Payer: COMMERCIAL

## 2024-01-11 VITALS
RESPIRATION RATE: 14 BRPM | WEIGHT: 160.4 LBS | HEART RATE: 64 BPM | BODY MASS INDEX: 31.49 KG/M2 | SYSTOLIC BLOOD PRESSURE: 128 MMHG | HEIGHT: 60 IN | TEMPERATURE: 97.7 F | DIASTOLIC BLOOD PRESSURE: 82 MMHG | OXYGEN SATURATION: 97 %

## 2024-01-11 DIAGNOSIS — R10.84 GENERALIZED ABDOMINAL PAIN: Primary | ICD-10-CM

## 2024-01-11 DIAGNOSIS — Z90.79 HISTORY OF TOTAL ABDOMINAL HYSTERECTOMY AND BILATERAL SALPINGO-OOPHORECTOMY: ICD-10-CM

## 2024-01-11 DIAGNOSIS — Z90.722 HISTORY OF TOTAL ABDOMINAL HYSTERECTOMY AND BILATERAL SALPINGO-OOPHORECTOMY: ICD-10-CM

## 2024-01-11 DIAGNOSIS — R19.7 DIARRHEA, UNSPECIFIED TYPE: ICD-10-CM

## 2024-01-11 DIAGNOSIS — Z90.710 HISTORY OF TOTAL ABDOMINAL HYSTERECTOMY AND BILATERAL SALPINGO-OOPHORECTOMY: ICD-10-CM

## 2024-01-11 DIAGNOSIS — K21.9 GASTROESOPHAGEAL REFLUX DISEASE WITHOUT ESOPHAGITIS: ICD-10-CM

## 2024-01-11 PROBLEM — F17.200 TOBACCO DEPENDENCE SYNDROME: Status: ACTIVE | Noted: 2023-01-23

## 2024-01-11 PROBLEM — G43.909 MIGRAINES: Status: ACTIVE | Noted: 2022-05-11

## 2024-01-11 PROBLEM — F43.12 CHRONIC POST-TRAUMATIC STRESS DISORDER (PTSD): Status: ACTIVE | Noted: 2023-09-19

## 2024-01-11 PROBLEM — N20.0 RIGHT KIDNEY STONE: Status: ACTIVE | Noted: 2024-01-11

## 2024-01-11 PROBLEM — G25.81 RESTLESS LEGS SYNDROME: Status: ACTIVE | Noted: 2023-09-19

## 2024-01-11 PROBLEM — J44.9 COPD (CHRONIC OBSTRUCTIVE PULMONARY DISEASE) (HCC): Status: ACTIVE | Noted: 2022-05-11

## 2024-01-11 PROBLEM — J30.89 ENVIRONMENTAL AND SEASONAL ALLERGIES: Status: ACTIVE | Noted: 2022-05-11

## 2024-01-11 PROBLEM — F41.1 GAD (GENERALIZED ANXIETY DISORDER): Status: ACTIVE | Noted: 2023-09-20

## 2024-01-11 PROBLEM — I67.1 CEREBRAL ARTERIAL ANEURYSM: Status: ACTIVE | Noted: 2021-11-29

## 2024-01-11 PROBLEM — M54.50 CHRONIC LOW BACK PAIN: Status: ACTIVE | Noted: 2022-05-11

## 2024-01-11 PROBLEM — G89.29 CHRONIC LOW BACK PAIN: Status: ACTIVE | Noted: 2022-05-11

## 2024-01-11 PROCEDURE — G8484 FLU IMMUNIZE NO ADMIN: HCPCS | Performed by: FAMILY MEDICINE

## 2024-01-11 PROCEDURE — 4004F PT TOBACCO SCREEN RCVD TLK: CPT | Performed by: FAMILY MEDICINE

## 2024-01-11 PROCEDURE — 99203 OFFICE O/P NEW LOW 30 MIN: CPT | Performed by: FAMILY MEDICINE

## 2024-01-11 PROCEDURE — G8417 CALC BMI ABV UP PARAM F/U: HCPCS | Performed by: FAMILY MEDICINE

## 2024-01-11 PROCEDURE — 3017F COLORECTAL CA SCREEN DOC REV: CPT | Performed by: FAMILY MEDICINE

## 2024-01-11 PROCEDURE — G8427 DOCREV CUR MEDS BY ELIG CLIN: HCPCS | Performed by: FAMILY MEDICINE

## 2024-01-11 RX ORDER — ONDANSETRON 4 MG/1
4 TABLET, FILM COATED ORAL EVERY 8 HOURS PRN
COMMUNITY
Start: 2023-12-27

## 2024-01-11 RX ORDER — LORATADINE 10 MG/1
10 TABLET ORAL DAILY
COMMUNITY
Start: 2023-12-24

## 2024-01-11 RX ORDER — CLOPIDOGREL BISULFATE 75 MG/1
75 TABLET ORAL DAILY
COMMUNITY
Start: 2023-11-27

## 2024-01-11 RX ORDER — ONDANSETRON 4 MG/1
4 TABLET, ORALLY DISINTEGRATING ORAL 3 TIMES DAILY PRN
Qty: 30 TABLET | Refills: 0 | Status: SHIPPED | OUTPATIENT
Start: 2024-01-11

## 2024-01-11 RX ORDER — ATORVASTATIN CALCIUM 40 MG/1
40 TABLET, FILM COATED ORAL DAILY
COMMUNITY
Start: 2023-11-15

## 2024-01-11 RX ORDER — OMEPRAZOLE 20 MG/1
20 CAPSULE, DELAYED RELEASE ORAL DAILY
COMMUNITY
Start: 2024-01-03 | End: 2024-01-11 | Stop reason: SDUPTHER

## 2024-01-11 RX ORDER — PROMETHAZINE HYDROCHLORIDE 25 MG/1
25 TABLET ORAL EVERY 8 HOURS PRN
COMMUNITY
Start: 2023-12-29 | End: 2024-01-11

## 2024-01-11 RX ORDER — NICOTINE 21 MG/24HR
1 PATCH, TRANSDERMAL 24 HOURS TRANSDERMAL EVERY 24 HOURS
COMMUNITY
Start: 2023-11-27

## 2024-01-11 RX ORDER — OMEPRAZOLE 20 MG/1
20 CAPSULE, DELAYED RELEASE ORAL DAILY
Qty: 30 CAPSULE | Refills: 0 | Status: SHIPPED | OUTPATIENT
Start: 2024-01-11

## 2024-01-11 RX ORDER — HYDROXYZINE PAMOATE 100 MG
100 CAPSULE ORAL
COMMUNITY
Start: 2023-12-23

## 2024-01-11 SDOH — ECONOMIC STABILITY: INCOME INSECURITY: HOW HARD IS IT FOR YOU TO PAY FOR THE VERY BASICS LIKE FOOD, HOUSING, MEDICAL CARE, AND HEATING?: NOT HARD AT ALL

## 2024-01-11 SDOH — ECONOMIC STABILITY: FOOD INSECURITY: WITHIN THE PAST 12 MONTHS, THE FOOD YOU BOUGHT JUST DIDN'T LAST AND YOU DIDN'T HAVE MONEY TO GET MORE.: NEVER TRUE

## 2024-01-11 SDOH — ECONOMIC STABILITY: HOUSING INSECURITY
IN THE LAST 12 MONTHS, WAS THERE A TIME WHEN YOU DID NOT HAVE A STEADY PLACE TO SLEEP OR SLEPT IN A SHELTER (INCLUDING NOW)?: NO

## 2024-01-11 SDOH — ECONOMIC STABILITY: FOOD INSECURITY: WITHIN THE PAST 12 MONTHS, YOU WORRIED THAT YOUR FOOD WOULD RUN OUT BEFORE YOU GOT MONEY TO BUY MORE.: NEVER TRUE

## 2024-01-11 ASSESSMENT — ENCOUNTER SYMPTOMS
BACK PAIN: 1
RHINORRHEA: 0
SHORTNESS OF BREATH: 0
VOMITING: 1
COUGH: 0
CHEST TIGHTNESS: 0
SORE THROAT: 0
WHEEZING: 0
DIARRHEA: 1
CONSTIPATION: 0
ABDOMINAL PAIN: 1
NAUSEA: 1
SINUS PAIN: 0
SINUS PRESSURE: 0
TROUBLE SWALLOWING: 0

## 2024-01-11 ASSESSMENT — PATIENT HEALTH QUESTIONNAIRE - PHQ9
SUM OF ALL RESPONSES TO PHQ9 QUESTIONS 1 & 2: 0
2. FEELING DOWN, DEPRESSED OR HOPELESS: 0
SUM OF ALL RESPONSES TO PHQ QUESTIONS 1-9: 0
1. LITTLE INTEREST OR PLEASURE IN DOING THINGS: 0
SUM OF ALL RESPONSES TO PHQ QUESTIONS 1-9: 0

## 2024-01-11 NOTE — PROGRESS NOTES
Normal appearance. She is normal weight. She is not ill-appearing.   HENT:      Head: Normocephalic and atraumatic.      Right Ear: No middle ear effusion.      Left Ear:  No middle ear effusion.      Nose: Mucosal edema and rhinorrhea present. No congestion. Rhinorrhea is clear.      Right Sinus: Maxillary sinus tenderness and frontal sinus tenderness present.      Left Sinus: Maxillary sinus tenderness and frontal sinus tenderness present.      Comments: Sinus pressure but patient thinks it is due to migraine     Mouth/Throat:      Pharynx: Posterior oropharyngeal erythema (posterior cobblestoning) present. No oropharyngeal exudate.   Eyes:      Extraocular Movements: Extraocular movements intact.      Pupils: Pupils are equal, round, and reactive to light.   Cardiovascular:      Rate and Rhythm: Normal rate and regular rhythm.      Heart sounds: Normal heart sounds. No murmur heard.     No gallop.   Pulmonary:      Effort: Pulmonary effort is normal.      Breath sounds: Normal breath sounds. No wheezing, rhonchi or rales.   Abdominal:      General: Abdomen is flat. Bowel sounds are normal. There is no distension.      Palpations: Abdomen is soft.      Tenderness: There is no abdominal tenderness. There is no right CVA tenderness, left CVA tenderness or guarding.      Comments: Tenderness upper quadrants primarily (slightly worse on the left side) but feels discomfort throughout entire stomach   Musculoskeletal:      Right lower leg: No edema.      Left lower leg: No edema.      Comments: Pain mid thoracic more on the left side, no appreciated spasm     Skin:     General: Skin is warm.   Neurological:      Mental Status: She is alert.   Psychiatric:         Mood and Affect: Mood normal.               ASSESSMENT/PLAN:  1. Generalized abdominal pain  -     H. Pylori Antigen, Stool; Future  -     ondansetron (ZOFRAN-ODT) 4 MG disintegrating tablet; Take 1 tablet by mouth 3 times daily as needed for Nausea or

## 2024-01-18 ENCOUNTER — NURSE ONLY (OUTPATIENT)
Dept: LAB | Age: 54
End: 2024-01-18

## 2024-01-18 DIAGNOSIS — R19.7 DIARRHEA, UNSPECIFIED TYPE: ICD-10-CM

## 2024-01-18 DIAGNOSIS — R10.84 GENERALIZED ABDOMINAL PAIN: ICD-10-CM

## 2024-01-18 LAB
REASON FOR REJECTION: NORMAL
REASON FOR REJECTION: NORMAL
REJECTED TEST: NORMAL
REJECTED TEST: NORMAL

## 2024-01-19 LAB — H PYLORI AG STL QL IA: NORMAL

## 2024-02-29 NOTE — TELEPHONE ENCOUNTER
Pt called stating she was sent home from work due to having a very intense migraine. She stated the pain is behind her right eye going behind her ear and down her right neck, \"in the muscle\". She stated the pain is constant and is out of the ordinary from her typical migraines. She takes topomax BID and is prescribed imitrex. However, she stated she is currently does not have any on hand. Spoke with CHERISE Bill regarding patient's symptoms and she recommends coming to the ED to get reassessed. Pt has a history of aneurysms and flow diverter placed in the right side July 2021, done in [place]. Pt expressed understanding to come to ED due to onset symptoms and severity. [FreeTextEntry1] :  Patient was traveling and was away in [place] and has a cough for the last 10 days denies fever or shortness of breath, has nasal congestion patient also complaining of difficulty with urination has frequency and incontinence,... [de-identified] : 57 y/o F presents with a complaint of URI symptoms, noting cough, myalgia, irritated throat and intermittent diarrhea Returned 1 week ago from [place],,,,Developed a cough with nasal congestion about a week ago continues to have cough.  Complaining of dysuria frequency urgency and incontinence and  Hx of non compliance with CPAP Pt has a hx of obesity, vit d def, hypothyroid, anemia ROS as documented below

## 2024-03-01 ENCOUNTER — OFFICE VISIT (OUTPATIENT)
Dept: FAMILY MEDICINE CLINIC | Age: 54
End: 2024-03-01
Payer: COMMERCIAL

## 2024-03-01 VITALS
HEART RATE: 77 BPM | TEMPERATURE: 97.6 F | DIASTOLIC BLOOD PRESSURE: 78 MMHG | SYSTOLIC BLOOD PRESSURE: 114 MMHG | OXYGEN SATURATION: 99 %

## 2024-03-01 DIAGNOSIS — J06.9 VIRAL URI: Primary | ICD-10-CM

## 2024-03-01 DIAGNOSIS — J44.9 CHRONIC OBSTRUCTIVE PULMONARY DISEASE, UNSPECIFIED COPD TYPE (HCC): ICD-10-CM

## 2024-03-01 LAB
INFLUENZA VIRUS A RNA: NEGATIVE
INFLUENZA VIRUS B RNA: NEGATIVE

## 2024-03-01 PROCEDURE — 4004F PT TOBACCO SCREEN RCVD TLK: CPT | Performed by: NURSE PRACTITIONER

## 2024-03-01 PROCEDURE — 87502 INFLUENZA DNA AMP PROBE: CPT | Performed by: NURSE PRACTITIONER

## 2024-03-01 PROCEDURE — G8417 CALC BMI ABV UP PARAM F/U: HCPCS | Performed by: NURSE PRACTITIONER

## 2024-03-01 PROCEDURE — G8484 FLU IMMUNIZE NO ADMIN: HCPCS | Performed by: NURSE PRACTITIONER

## 2024-03-01 PROCEDURE — 99214 OFFICE O/P EST MOD 30 MIN: CPT | Performed by: NURSE PRACTITIONER

## 2024-03-01 PROCEDURE — 3017F COLORECTAL CA SCREEN DOC REV: CPT | Performed by: NURSE PRACTITIONER

## 2024-03-01 PROCEDURE — G8427 DOCREV CUR MEDS BY ELIG CLIN: HCPCS | Performed by: NURSE PRACTITIONER

## 2024-03-01 PROCEDURE — 3023F SPIROM DOC REV: CPT | Performed by: NURSE PRACTITIONER

## 2024-03-01 RX ORDER — ALBUTEROL SULFATE 2.5 MG/3ML
2.5 SOLUTION RESPIRATORY (INHALATION) EVERY 6 HOURS PRN
Qty: 120 EACH | Refills: 0 | Status: SHIPPED | OUTPATIENT
Start: 2024-03-01

## 2024-03-01 RX ORDER — PREDNISONE 10 MG/1
10 TABLET ORAL 2 TIMES DAILY
Qty: 10 TABLET | Refills: 0 | Status: SHIPPED | OUTPATIENT
Start: 2024-03-01 | End: 2024-03-06

## 2024-03-01 ASSESSMENT — ENCOUNTER SYMPTOMS
CHEST TIGHTNESS: 0
SHORTNESS OF BREATH: 0
EYES NEGATIVE: 1
SINUS PRESSURE: 0
SINUS PAIN: 0
GASTROINTESTINAL NEGATIVE: 1
RHINORRHEA: 0
COUGH: 1
WHEEZING: 1

## 2024-03-01 NOTE — PROGRESS NOTES
[address] [place] [id number]  Dept: [phone]          Patito Dalton (:  1970) is a 54 y.o. female,Established patient, here for evaluation of the following chief complaint(s):  Nausea (Sinus, ear, chest pains x months, worse in the past 2-3 days.)      ASSESSMENT/PLAN:  I have reviewed the patient's medical history in detail and updated the computerized patient record.  HPI/ROS per the patient and caregiver.   Overall non toxic in appearance. Answers questions appropriately.   Conditions discussed and addressed this visit include:   Here for uri sx this week  Flu is negative  VSS  Afebrile  Tolerating fluids  No acute evidence for bacterial infection  Just had been on azithromycin last week  Will provide short course steroid  Refill albuterol  Follow up next week if sx persist.   1. Viral URI  -     POCT Influenza A/B DNA (Alere i)  2. Chronic obstructive pulmonary disease, unspecified COPD type (HCC)  -     predniSONE (DELTASONE) 10 MG tablet; Take 1 tablet by mouth 2 times daily for 5 days, Disp-10 tablet, R-0Normal  -     albuterol (PROVENTIL) (2.5 MG/3ML) 0.083% nebulizer solution; Take 3 mLs by nebulization every 6 hours as needed for Wheezing, Disp-120 each, R-0Normal  -     POCT Influenza A/B DNA (Alere i)      Return if symptoms worsen or fail to improve, for Medication refill, Routine follow up.    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 Negative for activity change, appetite change and fever.   HENT:  Negative for congestion, mouth sores, nosebleeds, rhinorrhea, sinus pressure, sinus pain and tinnitus.    Eyes: Negative.

## 2024-03-01 NOTE — PATIENT INSTRUCTIONS
Suggestions for symptom management that are available over the counter.   If you have questions regarding allergies or contraindications to use, please speak to the pharmacy staff or your family provider.    For fevers or pain: acetaminophen (Tylenol), ibuprofen (Motrin)  For dry cough: medications containing dextromethorphan, such as Delsym, Robitussin DM or Mucinex DM and medicated throat lozenges  For congestion or sinus pressure: decongestant nasal sprays, such as Afrin (for up to 3 days), medications containing guaifenesin to help break up mucus, such as Mucinex or Robitussin, nasal steroid sprays, such as Flonase, Sensimist, Rhinocort or Nasonex and saline nasal sprays, neti pot or sinus rinse bottle  For runny nose, sneezing or watery/itchy eyes: less sedating antihistamines, such as loratidine (Claritin), fexofenadine (Allegra) or Cetirizine (Zyrtec) and antihistamine eye drops, such as ketotifen (Zaditor, Alaway) or olopatadine (Pataday)  For sore throat:  Chloraseptic throat spray or sore throat lozenges or  Mucinex Instasoothe Sore Throat & Pain Cherry Spray  If you have high blood pressure, a brand like Coricidin HBP may be an option.you should avoid medications containing pseudoephedrine or phenylephrine, such as Sudafed,  running a humidifier in your bedroom may be helpful for many of your symptoms.  If your cough is keeping you awake at night, you can try raising your head with an extra pillow.  If the skin around your nose and lips becomes sore, you can put some petroleum jelly on the area.      Suggestions for over-the-counter supplements to help your immune system, if you have questions regarding allergies or contraindications to use, please speak to the pharmacy staff or your family provider.    Multi-vitamin/multi-mineral daily   Vitamin D3 3000 IU daily  Zinc 30 mg daily  Vitamin C 1000 mg twice daily  B-100 complex as daily as directed on bottle  Probiotic/Prebiotic payne  Gargle with

## 2024-03-05 ENCOUNTER — OFFICE VISIT (OUTPATIENT)
Dept: FAMILY MEDICINE CLINIC | Age: 54
End: 2024-03-05
Payer: COMMERCIAL

## 2024-03-05 VITALS
HEART RATE: 77 BPM | WEIGHT: 161.8 LBS | TEMPERATURE: 97.7 F | OXYGEN SATURATION: 97 % | RESPIRATION RATE: 16 BRPM | BODY MASS INDEX: 31.77 KG/M2 | DIASTOLIC BLOOD PRESSURE: 82 MMHG | HEIGHT: 60 IN | SYSTOLIC BLOOD PRESSURE: 124 MMHG

## 2024-03-05 DIAGNOSIS — J01.10 ACUTE NON-RECURRENT FRONTAL SINUSITIS: Primary | ICD-10-CM

## 2024-03-05 DIAGNOSIS — R53.83 OTHER FATIGUE: ICD-10-CM

## 2024-03-05 DIAGNOSIS — M79.10 MYALGIA: ICD-10-CM

## 2024-03-05 DIAGNOSIS — E55.9 VITAMIN D DEFICIENCY: ICD-10-CM

## 2024-03-05 PROCEDURE — G8427 DOCREV CUR MEDS BY ELIG CLIN: HCPCS | Performed by: FAMILY MEDICINE

## 2024-03-05 PROCEDURE — G8484 FLU IMMUNIZE NO ADMIN: HCPCS | Performed by: FAMILY MEDICINE

## 2024-03-05 PROCEDURE — 4004F PT TOBACCO SCREEN RCVD TLK: CPT | Performed by: FAMILY MEDICINE

## 2024-03-05 PROCEDURE — 3017F COLORECTAL CA SCREEN DOC REV: CPT | Performed by: FAMILY MEDICINE

## 2024-03-05 PROCEDURE — G8417 CALC BMI ABV UP PARAM F/U: HCPCS | Performed by: FAMILY MEDICINE

## 2024-03-05 PROCEDURE — 99214 OFFICE O/P EST MOD 30 MIN: CPT | Performed by: FAMILY MEDICINE

## 2024-03-05 RX ORDER — AMOXICILLIN AND CLAVULANATE POTASSIUM 875; 125 MG/1; MG/1
1 TABLET, FILM COATED ORAL 2 TIMES DAILY
Qty: 20 TABLET | Refills: 0 | Status: SHIPPED | OUTPATIENT
Start: 2024-03-05 | End: 2024-03-15

## 2024-03-05 ASSESSMENT — ENCOUNTER SYMPTOMS
SINUS PRESSURE: 0
RHINORRHEA: 1
CONSTIPATION: 0
NAUSEA: 0
VOMITING: 0
BACK PAIN: 1
WHEEZING: 0
TROUBLE SWALLOWING: 0
DIARRHEA: 0
SHORTNESS OF BREATH: 1
SORE THROAT: 1
SINUS PAIN: 1
COUGH: 1
CHEST TIGHTNESS: 0

## 2024-03-05 NOTE — PROGRESS NOTES
Patito Dalton (:  1970) is a 54 y.o. female,Established patient, here for evaluation of the following chief complaint(s):  Epistaxis, Cough, Congestion, Fatigue, and Shortness of Breath      Subjective   SUBJECTIVE/OBJECTIVE:  HPI    Patient presents with cold symptoms for several weeks but much worse the last few days  Known exposures no known sick exposures, suspects she had CoVid at the end of the year last year and just hasn't felt well since then   Treatment prednisone currently  Better minimal help with the prednisone  Worse nasal congestion, L sided bloody nose this morning, cough seems to be worse especially when lying down  PMH low Vitamin D and M07---rgw been taking some OTC Vitamin B12, just has no energy, has been told that she has ulcers by GI    Review of Systems   Constitutional:  Positive for fatigue. Negative for activity change, chills and fever.   HENT:  Positive for congestion, ear pain, nosebleeds (left sided), rhinorrhea, sinus pain and sore throat. Negative for postnasal drip, sinus pressure, sneezing and trouble swallowing.    Respiratory:  Positive for cough and shortness of breath (with activity, thinks related to stuffy nose). Negative for chest tightness and wheezing.    Cardiovascular:  Negative for chest pain, palpitations and leg swelling.   Gastrointestinal:  Negative for constipation, diarrhea, nausea and vomiting.   Musculoskeletal:  Positive for back pain, myalgias (end of the day, especially after working lots of hours) and neck stiffness. Negative for arthralgias.   Skin:  Negative for rash.   Neurological:  Positive for light-headedness (if moves too quickly) and headaches (sinus). Negative for dizziness.   Hematological:  Negative for adenopathy.          Objective   Physical Exam  Vitals reviewed.   Constitutional:       General: She is not in acute distress.     Appearance: Normal appearance. She is normal weight. She is not ill-appearing.   HENT:      Head:

## 2024-03-06 ENCOUNTER — NURSE ONLY (OUTPATIENT)
Dept: LAB | Age: 54
End: 2024-03-06

## 2024-03-06 DIAGNOSIS — M79.10 MYALGIA: ICD-10-CM

## 2024-03-06 DIAGNOSIS — R53.83 OTHER FATIGUE: ICD-10-CM

## 2024-03-06 DIAGNOSIS — J01.10 ACUTE NON-RECURRENT FRONTAL SINUSITIS: ICD-10-CM

## 2024-03-06 DIAGNOSIS — E55.9 VITAMIN D DEFICIENCY: ICD-10-CM

## 2024-03-06 LAB
ALBUMIN SERPL BCG-MCNC: 4.3 G/DL (ref 3.5–5.1)
ALP SERPL-CCNC: 115 U/L (ref 38–126)
ALT SERPL W/O P-5'-P-CCNC: 14 U/L (ref 11–66)
ANION GAP SERPL CALC-SCNC: 11 MEQ/L (ref 8–16)
AST SERPL-CCNC: 13 U/L (ref 5–40)
BASOPHILS ABSOLUTE: 0.1 THOU/MM3 (ref 0–0.1)
BASOPHILS NFR BLD AUTO: 1 %
BILIRUB SERPL-MCNC: 0.2 MG/DL (ref 0.3–1.2)
BUN SERPL-MCNC: 13 MG/DL (ref 7–22)
CALCIUM SERPL-MCNC: 9.7 MG/DL (ref 8.5–10.5)
CHLORIDE SERPL-SCNC: 106 MEQ/L (ref 98–111)
CO2 SERPL-SCNC: 25 MEQ/L (ref 23–33)
CREAT SERPL-MCNC: 0.6 MG/DL (ref 0.4–1.2)
CRP SERPL-MCNC: < 0.3 MG/DL (ref 0–1)
EOSINOPHIL NFR BLD AUTO: 3.4 %
EOSINOPHILS ABSOLUTE: 0.4 THOU/MM3 (ref 0–0.4)
ERYTHROCYTE [DISTWIDTH] IN BLOOD BY AUTOMATED COUNT: 12.5 % (ref 11.5–14.5)
ERYTHROCYTE [SEDIMENTATION RATE] IN BLOOD BY WESTERGREN METHOD: 14 MM/HR (ref 0–20)
FOLATE SERPL-MCNC: 7.7 NG/ML (ref 4.8–24.2)
GFR SERPL CREATININE-BSD FRML MDRD: > 60 ML/MIN/1.73M2
GLUCOSE SERPL-MCNC: 88 MG/DL (ref 70–108)
HCT VFR BLD AUTO: 44.6 % (ref 37–47)
HGB BLD-MCNC: 14.6 GM/DL (ref 12–16)
IMM GRANULOCYTES # BLD AUTO: 0.12 THOU/MM3 (ref 0–0.07)
IMM GRANULOCYTES NFR BLD AUTO: 1 %
IRON SERPL-MCNC: 82 UG/DL (ref 50–170)
LYMPHOCYTES ABSOLUTE: 4 THOU/MM3 (ref 1–4.8)
LYMPHOCYTES NFR BLD AUTO: 35 %
MCH RBC QN AUTO: 30.8 PG (ref 26–33)
MCV RBC AUTO: 94.1 FL (ref 81–99)
MONOCYTES ABSOLUTE: 1 THOU/MM3 (ref 0.4–1.3)
MONOCYTES NFR BLD AUTO: 8.3 %
NEUTROPHILS NFR BLD AUTO: 51.3 %
NRBC BLD AUTO-RTO: 0 /100 WBC
PLATELET # BLD AUTO: 396 THOU/MM3 (ref 130–400)
PMV BLD AUTO: 11 FL (ref 9.4–12.4)
POTASSIUM SERPL-SCNC: 4.2 MEQ/L (ref 3.5–5.2)
PROT SERPL-MCNC: 6.8 G/DL (ref 6.1–8)
RBC # BLD AUTO: 4.74 MILL/MM3 (ref 4.2–5.4)
SEGMENTED NEUTROPHILS ABSOLUTE COUNT: 5.9 THOU/MM3 (ref 1.8–7.7)
SODIUM SERPL-SCNC: 142 MEQ/L (ref 135–145)
TSH SERPL DL<=0.005 MIU/L-ACNC: 0.91 UIU/ML (ref 0.4–4.2)
VIT B12 SERPL-MCNC: 498 PG/ML (ref 211–911)
WBC # BLD AUTO: 11.5 THOU/MM3 (ref 4.8–10.8)

## 2024-03-07 LAB — INSULIN SERPL-ACNC: 19.7 MU/L

## 2024-03-08 LAB — NUCLEAR IGG SER QL IA: NORMAL

## 2024-04-01 ENCOUNTER — OFFICE VISIT (OUTPATIENT)
Dept: FAMILY MEDICINE CLINIC | Age: 54
End: 2024-04-01
Payer: COMMERCIAL

## 2024-04-01 ENCOUNTER — PATIENT MESSAGE (OUTPATIENT)
Dept: FAMILY MEDICINE CLINIC | Age: 54
End: 2024-04-01

## 2024-04-01 VITALS
SYSTOLIC BLOOD PRESSURE: 110 MMHG | DIASTOLIC BLOOD PRESSURE: 82 MMHG | HEIGHT: 60 IN | BODY MASS INDEX: 32.51 KG/M2 | OXYGEN SATURATION: 98 % | HEART RATE: 76 BPM | RESPIRATION RATE: 18 BRPM | TEMPERATURE: 97.9 F | WEIGHT: 165.6 LBS

## 2024-04-01 DIAGNOSIS — E78.49 OTHER HYPERLIPIDEMIA: ICD-10-CM

## 2024-04-01 DIAGNOSIS — J44.9 CHRONIC OBSTRUCTIVE PULMONARY DISEASE, UNSPECIFIED COPD TYPE (HCC): ICD-10-CM

## 2024-04-01 DIAGNOSIS — K21.9 GASTROESOPHAGEAL REFLUX DISEASE WITHOUT ESOPHAGITIS: ICD-10-CM

## 2024-04-01 DIAGNOSIS — G89.29 CHRONIC LOW BACK PAIN, UNSPECIFIED BACK PAIN LATERALITY, UNSPECIFIED WHETHER SCIATICA PRESENT: ICD-10-CM

## 2024-04-01 DIAGNOSIS — M54.50 CHRONIC LOW BACK PAIN, UNSPECIFIED BACK PAIN LATERALITY, UNSPECIFIED WHETHER SCIATICA PRESENT: ICD-10-CM

## 2024-04-01 DIAGNOSIS — U07.1 COVID: Primary | ICD-10-CM

## 2024-04-01 DIAGNOSIS — R10.84 GENERALIZED ABDOMINAL PAIN: ICD-10-CM

## 2024-04-01 DIAGNOSIS — I67.1 CEREBRAL ARTERIAL ANEURYSM: ICD-10-CM

## 2024-04-01 PROBLEM — J30.89 ENVIRONMENTAL AND SEASONAL ALLERGIES: Status: RESOLVED | Noted: 2022-05-11 | Resolved: 2024-04-01

## 2024-04-01 PROBLEM — N20.0 RIGHT KIDNEY STONE: Status: RESOLVED | Noted: 2024-01-11 | Resolved: 2024-04-01

## 2024-04-01 PROCEDURE — 3017F COLORECTAL CA SCREEN DOC REV: CPT | Performed by: FAMILY MEDICINE

## 2024-04-01 PROCEDURE — 4004F PT TOBACCO SCREEN RCVD TLK: CPT | Performed by: FAMILY MEDICINE

## 2024-04-01 PROCEDURE — 3023F SPIROM DOC REV: CPT | Performed by: FAMILY MEDICINE

## 2024-04-01 PROCEDURE — 99213 OFFICE O/P EST LOW 20 MIN: CPT | Performed by: FAMILY MEDICINE

## 2024-04-01 PROCEDURE — G8417 CALC BMI ABV UP PARAM F/U: HCPCS | Performed by: FAMILY MEDICINE

## 2024-04-01 PROCEDURE — G8427 DOCREV CUR MEDS BY ELIG CLIN: HCPCS | Performed by: FAMILY MEDICINE

## 2024-04-01 RX ORDER — GABAPENTIN 300 MG/1
900 CAPSULE ORAL 3 TIMES DAILY
Qty: 270 CAPSULE | Refills: 5 | Status: SHIPPED | OUTPATIENT
Start: 2024-04-01 | End: 2024-09-28

## 2024-04-01 RX ORDER — CLOPIDOGREL BISULFATE 75 MG/1
75 TABLET ORAL DAILY
Qty: 30 TABLET | Refills: 5 | Status: SHIPPED | OUTPATIENT
Start: 2024-04-01

## 2024-04-01 RX ORDER — ONDANSETRON 4 MG/1
4 TABLET, ORALLY DISINTEGRATING ORAL 3 TIMES DAILY PRN
Qty: 30 TABLET | Refills: 0 | Status: SHIPPED | OUTPATIENT
Start: 2024-04-01

## 2024-04-01 RX ORDER — ATORVASTATIN CALCIUM 40 MG/1
40 TABLET, FILM COATED ORAL DAILY
Qty: 30 TABLET | Refills: 5 | Status: SHIPPED | OUTPATIENT
Start: 2024-04-01

## 2024-04-01 RX ORDER — ALBUTEROL SULFATE 2.5 MG/3ML
2.5 SOLUTION RESPIRATORY (INHALATION) EVERY 6 HOURS PRN
Qty: 120 EACH | Refills: 5 | Status: SHIPPED | OUTPATIENT
Start: 2024-04-01

## 2024-04-01 RX ORDER — OMEPRAZOLE 40 MG/1
40 CAPSULE, DELAYED RELEASE ORAL 2 TIMES DAILY
Qty: 60 CAPSULE | Refills: 5 | Status: SHIPPED | OUTPATIENT
Start: 2024-04-01

## 2024-04-01 ASSESSMENT — ENCOUNTER SYMPTOMS
CONSTIPATION: 0
SINUS PAIN: 0
WHEEZING: 1
SINUS PRESSURE: 1
COUGH: 1
CHEST TIGHTNESS: 0
TROUBLE SWALLOWING: 0
SHORTNESS OF BREATH: 0
RHINORRHEA: 1
SORE THROAT: 1

## 2024-04-01 NOTE — TELEPHONE ENCOUNTER
I would recommend keeping your appointment to make sure we don't need to do something besides Paxlovid to help with your symptoms

## 2024-04-01 NOTE — TELEPHONE ENCOUNTER
From: Patito Dalton  To: Dr. Regi Laurent  Sent: 4/1/2024 9:42 AM EDT  Subject: I've tested positive for covid     I have applied scheduled with you today unless you would call in some palovid for me ?

## 2024-04-01 NOTE — PROGRESS NOTES
Patito Dalton (:  1970) is a 54 y.o. female,Established patient, here for evaluation of the following chief complaint(s):  Congestion (Covid positive home test)      Subjective   SUBJECTIVE/OBJECTIVE:  HPI  Patient presents with cold symptoms for 2 days  Known exposures CoVid positive patient at [facility]  Treatment Tylenol  Better minimal help with body aches, no help with headache  Worse headache  Patient has upcoming appt with Gi 4/15/2024---will discuss omeprazole use with them due to interaction with Plavix    Review of Systems   Constitutional:  Positive for fatigue. Negative for activity change, chills and fever.   HENT:  Positive for congestion, postnasal drip, rhinorrhea, sinus pressure and sore throat. Negative for ear pain, sinus pain, sneezing and trouble swallowing.    Respiratory:  Positive for cough and wheezing. Negative for chest tightness and shortness of breath.    Cardiovascular:  Negative for chest pain, palpitations and leg swelling.   Gastrointestinal:  Negative for constipation, diarrhea, nausea and vomiting.   Musculoskeletal:  Positive for myalgias (better). Negative for arthralgias.   Skin:  Negative for rash.   Neurological:  Positive for dizziness (with motion) and headaches. Negative for light-headedness.   Hematological:  Negative for adenopathy.          Objective   Physical Exam  Vitals reviewed.   Constitutional:       General: She is not in acute distress.     Appearance: Normal appearance. She is normal weight. She is not ill-appearing.   HENT:      Head: Normocephalic and atraumatic.      Right Ear: A middle ear effusion is present.      Left Ear: A middle ear effusion is present.      Nose: Mucosal edema, congestion and rhinorrhea present. Rhinorrhea is clear.      Right Turbinates: Swollen.      Left Turbinates: Swollen.      Right Sinus: Frontal sinus tenderness present. No maxillary sinus tenderness.      Left Sinus: Frontal sinus tenderness present. No maxillary

## 2024-10-03 ENCOUNTER — TELEPHONE (OUTPATIENT)
Dept: NEUROLOGY | Age: 54
End: 2024-10-03

## 2024-10-03 NOTE — TELEPHONE ENCOUNTER
Voicemail left for patient to call the office back to reschedule appointment due to physician schedule.   
Dr. Collins

## 2024-10-11 ENCOUNTER — TELEPHONE (OUTPATIENT)
Dept: NEUROLOGY | Age: 54
End: 2024-10-11

## 2024-10-31 DIAGNOSIS — I67.1 INTRACRANIAL ANEURYSM: Primary | ICD-10-CM

## 2024-10-31 DIAGNOSIS — I67.1 CEREBRAL ANEURYSM: ICD-10-CM

## 2024-11-11 ENCOUNTER — TELEPHONE (OUTPATIENT)
Dept: NEUROLOGY | Age: 54
End: 2024-11-11

## 2024-11-11 NOTE — TELEPHONE ENCOUNTER
Called patient and left voicemail for the patient to reschedule imaging and office visit due to patient not showing up to previously scheduled imaging.

## 2024-11-15 ENCOUNTER — TELEPHONE (OUTPATIENT)
Dept: NEUROLOGY | Age: 54
End: 2024-11-15

## 2024-11-15 NOTE — TELEPHONE ENCOUNTER
Second voicemail left for patient to reschedule imaging and office visit due to patient not showing up to previously scheduled imaging.

## 2024-11-18 ENCOUNTER — TELEPHONE (OUTPATIENT)
Dept: NEUROLOGY | Age: 54
End: 2024-11-18

## 2024-11-18 NOTE — TELEPHONE ENCOUNTER
Third voicemail left for patient to reschedule imaging and office visit due to patient not showing up to previously scheduled imaging.

## 2025-01-23 ENCOUNTER — OFFICE VISIT (OUTPATIENT)
Dept: FAMILY MEDICINE CLINIC | Age: 55
End: 2025-01-23
Payer: COMMERCIAL

## 2025-01-23 VITALS
TEMPERATURE: 97.3 F | BODY MASS INDEX: 30.86 KG/M2 | OXYGEN SATURATION: 99 % | HEART RATE: 93 BPM | DIASTOLIC BLOOD PRESSURE: 80 MMHG | WEIGHT: 158 LBS | SYSTOLIC BLOOD PRESSURE: 110 MMHG

## 2025-01-23 DIAGNOSIS — R09.89 SYMPTOMS OF UPPER RESPIRATORY INFECTION (URI): ICD-10-CM

## 2025-01-23 DIAGNOSIS — J44.9 CHRONIC OBSTRUCTIVE PULMONARY DISEASE, UNSPECIFIED COPD TYPE (HCC): ICD-10-CM

## 2025-01-23 DIAGNOSIS — J01.41 ACUTE RECURRENT PANSINUSITIS: Primary | ICD-10-CM

## 2025-01-23 LAB
Lab: NORMAL
QC PASS/FAIL: NORMAL
SARS-COV-2 RDRP RESP QL NAA+PROBE: NEGATIVE

## 2025-01-23 PROCEDURE — 3017F COLORECTAL CA SCREEN DOC REV: CPT

## 2025-01-23 PROCEDURE — G8427 DOCREV CUR MEDS BY ELIG CLIN: HCPCS

## 2025-01-23 PROCEDURE — 99213 OFFICE O/P EST LOW 20 MIN: CPT

## 2025-01-23 PROCEDURE — 87635 SARS-COV-2 COVID-19 AMP PRB: CPT

## 2025-01-23 PROCEDURE — 3023F SPIROM DOC REV: CPT

## 2025-01-23 PROCEDURE — 4004F PT TOBACCO SCREEN RCVD TLK: CPT

## 2025-01-23 PROCEDURE — G8417 CALC BMI ABV UP PARAM F/U: HCPCS

## 2025-01-23 RX ORDER — CETIRIZINE HYDROCHLORIDE 10 MG/1
10 TABLET ORAL DAILY
Qty: 30 TABLET | Refills: 0 | Status: SHIPPED | OUTPATIENT
Start: 2025-01-23

## 2025-01-23 RX ORDER — FLUTICASONE PROPIONATE 50 MCG
1 SPRAY, SUSPENSION (ML) NASAL DAILY
Qty: 1 EACH | Refills: 0 | Status: SHIPPED | OUTPATIENT
Start: 2025-01-23

## 2025-01-23 RX ORDER — BENZONATATE 200 MG/1
200 CAPSULE ORAL 3 TIMES DAILY PRN
Qty: 30 CAPSULE | Refills: 0 | Status: SHIPPED | OUTPATIENT
Start: 2025-01-23 | End: 2025-02-02

## 2025-01-23 RX ORDER — ONDANSETRON 4 MG/1
4 TABLET, ORALLY DISINTEGRATING ORAL 3 TIMES DAILY PRN
Qty: 30 TABLET | Refills: 0 | Status: SHIPPED | OUTPATIENT
Start: 2025-01-23

## 2025-01-23 RX ORDER — AZITHROMYCIN 250 MG/1
TABLET, FILM COATED ORAL
Qty: 6 TABLET | Refills: 0 | Status: SHIPPED | OUTPATIENT
Start: 2025-01-23 | End: 2025-02-02

## 2025-01-23 SDOH — ECONOMIC STABILITY: FOOD INSECURITY: WITHIN THE PAST 12 MONTHS, THE FOOD YOU BOUGHT JUST DIDN'T LAST AND YOU DIDN'T HAVE MONEY TO GET MORE.: NEVER TRUE

## 2025-01-23 SDOH — ECONOMIC STABILITY: FOOD INSECURITY: WITHIN THE PAST 12 MONTHS, YOU WORRIED THAT YOUR FOOD WOULD RUN OUT BEFORE YOU GOT MONEY TO BUY MORE.: NEVER TRUE

## 2025-01-23 ASSESSMENT — ENCOUNTER SYMPTOMS
SHORTNESS OF BREATH: 1
SINUS PAIN: 1
COUGH: 1
VOMITING: 1
SINUS PRESSURE: 1
CHEST TIGHTNESS: 0
WHEEZING: 0
SORE THROAT: 1
NAUSEA: 1
RHINORRHEA: 1

## 2025-01-23 ASSESSMENT — PATIENT HEALTH QUESTIONNAIRE - PHQ9
1. LITTLE INTEREST OR PLEASURE IN DOING THINGS: NOT AT ALL
SUM OF ALL RESPONSES TO PHQ QUESTIONS 1-9: 0
2. FEELING DOWN, DEPRESSED OR HOPELESS: NOT AT ALL
SUM OF ALL RESPONSES TO PHQ9 QUESTIONS 1 & 2: 0
SUM OF ALL RESPONSES TO PHQ QUESTIONS 1-9: 0

## 2025-01-23 NOTE — PROGRESS NOTES
Patito Dalton (:  1970) is a 54 y.o. female,Established patient, here for evaluation of the following chief complaint(s):  Fever (Started on Monday, body ache, nausea, cough. )         Assessment & Plan  Acute recurrent pansinusitis   Acute condition, recurrent, Supportive care with appropriate antipyretics and fluids. Suspect viral pansinusitis, however in setting of hx of copd and sinus sx, we will rx augmentin and zpack to be taken if symptoms not improving on  from original onset of .  Wok noted given.    Orders:    dextromethorphan-guaiFENesin  MG/5ML LIQD syrup; Take 10 mLs by mouth every 4 hours as needed (cough)    benzonatate (TESSALON) 200 MG capsule; Take 1 capsule by mouth 3 times daily as needed for Cough    cetirizine (ZYRTEC) 10 MG tablet; Take 1 tablet by mouth daily    fluticasone (FLONASE) 50 MCG/ACT nasal spray; 1 spray by Each Nostril route daily    amoxicillin-clavulanate (AUGMENTIN) 875-125 MG per tablet; Take 1 tablet by mouth 2 times daily for 10 days Take only if sinuses are not improved by  from .    azithromycin (ZITHROMAX) 250 MG tablet; 500mg on day 1 followed by 250mg on days 2 - 5    Symptoms of upper respiratory infection (URI)   Acute condition, new, Supportive care with appropriate antipyretics and fluids. Flu covid19 negaive, out of window for tamiflu so we will not test for flu at this time as will no change clinical management. Focus on symptomatic treatment at this time.    Orders:    POCT COVID-19 Rapid, NAAT    dextromethorphan-guaiFENesin  MG/5ML LIQD syrup; Take 10 mLs by mouth every 4 hours as needed (cough)    benzonatate (TESSALON) 200 MG capsule; Take 1 capsule by mouth 3 times daily as needed for Cough    cetirizine (ZYRTEC) 10 MG tablet; Take 1 tablet by mouth daily    fluticasone (FLONASE) 50 MCG/ACT nasal spray; 1 spray by Each Nostril route daily    ondansetron (ZOFRAN-ODT) 4 MG

## 2025-01-23 NOTE — ASSESSMENT & PLAN NOTE
Chronic, at goal (stable), noted COPD, with Uri symptoms as above. Consider CXR if not improving pt amicable. , medication adherence emphasized, and lifestyle modifications recommended    Orders:    azithromycin (ZITHROMAX) 250 MG tablet; 500mg on day 1 followed by 250mg on days 2 - 5

## 2025-07-31 ENCOUNTER — OFFICE VISIT (OUTPATIENT)
Dept: FAMILY MEDICINE CLINIC | Age: 55
End: 2025-07-31
Payer: COMMERCIAL

## 2025-07-31 VITALS
HEART RATE: 72 BPM | DIASTOLIC BLOOD PRESSURE: 90 MMHG | OXYGEN SATURATION: 97 % | TEMPERATURE: 98.2 F | BODY MASS INDEX: 26.83 KG/M2 | WEIGHT: 137.38 LBS | SYSTOLIC BLOOD PRESSURE: 132 MMHG

## 2025-07-31 DIAGNOSIS — K52.9 ACUTE GASTROENTERITIS: Primary | ICD-10-CM

## 2025-07-31 DIAGNOSIS — R19.7 DIARRHEA, UNSPECIFIED TYPE: ICD-10-CM

## 2025-07-31 PROCEDURE — 3017F COLORECTAL CA SCREEN DOC REV: CPT

## 2025-07-31 PROCEDURE — G8417 CALC BMI ABV UP PARAM F/U: HCPCS

## 2025-07-31 PROCEDURE — G8427 DOCREV CUR MEDS BY ELIG CLIN: HCPCS

## 2025-07-31 PROCEDURE — 4004F PT TOBACCO SCREEN RCVD TLK: CPT

## 2025-07-31 PROCEDURE — 99213 OFFICE O/P EST LOW 20 MIN: CPT

## 2025-07-31 RX ORDER — LOPERAMIDE HYDROCHLORIDE 2 MG/1
2 CAPSULE ORAL 4 TIMES DAILY PRN
Qty: 30 CAPSULE | Refills: 0 | Status: SHIPPED | OUTPATIENT
Start: 2025-07-31

## 2025-07-31 RX ORDER — ONDANSETRON 4 MG/1
4 TABLET, ORALLY DISINTEGRATING ORAL 3 TIMES DAILY PRN
Qty: 30 TABLET | Refills: 0 | Status: SHIPPED | OUTPATIENT
Start: 2025-07-31

## 2025-07-31 NOTE — PROGRESS NOTES
PROGRESS NOTES      Patient:  Patito Dalton  YOB: 1970    MRN: [id number]       PCP: Regi Laurent DO    Last Seen: 1/23/2025     Date of Service: Pt seen/examined on 08/01/25     ASSESSMENT/PLAN:    Assessment & Plan  Acute gastroenteritis   Acute condition, new, Watery NBNB diarrha s/p 1-2 hrs of eatting at work cafeteria, beef, cheese, and salsa chimichanage. Suspect 2/2 ETEC vs Staph vs b cerus,. Self limiting likely, continue hydration and supportive care.    Orders:    ondansetron (ZOFRAN-ODT) 4 MG disintegrating tablet; Take 1 tablet by mouth 3 times daily as needed for Nausea or Vomiting    loperamide (IMODIUM) 2 MG capsule; Take 1 capsule by mouth 4 times daily as needed for Diarrhea    Diarrhea, unspecified type   Acute condition, new, Supportive care with appropriate antipyretics and fluids.  Watery NBNB diarrha s/p 1-2 hrs of eatting at work cafeteria, beef, cheese, and salsa chimichanage. Suspect 2/2 ETEC vs Staph vs b cerus,. Self limiting likely, continue hydration and supportive care.    Orders:    ondansetron (ZOFRAN-ODT) 4 MG disintegrating tablet; Take 1 tablet by mouth 3 times daily as needed for Nausea or Vomiting    loperamide (IMODIUM) 2 MG capsule; Take 1 capsule by mouth 4 times daily as needed for Diarrhea          Plan        Return if symptoms worsen or fail to improve.        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 She ate food from the cafeteria today around 1:00. Since then she has had diarrhea and an upset stomach. Her  told her to come to the doctor and to take off work tomorrow, but needed a doctor's note. Patient has been using tums for the last couple hours.           History Of Present Illness:      Patient is a 55 y.o. female with past medical history of COPD, ROJAS, PTSD, Cannabis use, Chronic backpain, RLS, HLD, migraines, GERD. Presenting for  acute

## 2025-08-01 ASSESSMENT — ENCOUNTER SYMPTOMS
ABDOMINAL PAIN: 0
NAUSEA: 0
WHEEZING: 0
BLOOD IN STOOL: 0
RECTAL PAIN: 0
COUGH: 0
DIARRHEA: 1
ANAL BLEEDING: 0
CONSTIPATION: 0
VOMITING: 0
SHORTNESS OF BREATH: 0